# Patient Record
Sex: MALE | Race: WHITE
[De-identification: names, ages, dates, MRNs, and addresses within clinical notes are randomized per-mention and may not be internally consistent; named-entity substitution may affect disease eponyms.]

---

## 2017-06-26 ENCOUNTER — HOSPITAL (OUTPATIENT)
Dept: OTHER | Age: 52
End: 2017-06-26
Attending: OPHTHALMOLOGY

## 2017-06-26 LAB
DEVICE SN: ABNORMAL
POC_GLU: 117 MG/DL (ref 70–99)
TECH_ID: ABNORMAL

## 2018-04-10 PROBLEM — E78.5 HYPERLIPIDEMIA LDL GOAL <70: Status: ACTIVE | Noted: 2018-04-10

## 2018-04-10 PROBLEM — E11.39 UNCONTROLLED TYPE 2 DIABETES MELLITUS WITH OTHER OPHTHALMIC COMPLICATION, WITHOUT LONG-TERM CURRENT USE OF INSULIN: Status: ACTIVE | Noted: 2018-04-10

## 2018-04-10 PROBLEM — E11.65 UNCONTROLLED TYPE 2 DIABETES MELLITUS WITH OTHER OPHTHALMIC COMPLICATION, WITHOUT LONG-TERM CURRENT USE OF INSULIN: Status: ACTIVE | Noted: 2018-04-10

## 2018-04-20 PROBLEM — H33.22 LEFT RETINAL DETACHMENT: Status: ACTIVE | Noted: 2018-01-22

## 2018-07-31 PROCEDURE — 81001 URINALYSIS AUTO W/SCOPE: CPT | Performed by: INTERNAL MEDICINE

## 2018-07-31 PROCEDURE — 82570 ASSAY OF URINE CREATININE: CPT | Performed by: INTERNAL MEDICINE

## 2018-07-31 PROCEDURE — 82043 UR ALBUMIN QUANTITATIVE: CPT | Performed by: INTERNAL MEDICINE

## 2018-10-29 PROBLEM — E11.65 UNCONTROLLED TYPE 2 DIABETES MELLITUS WITH HYPERGLYCEMIA (HCC): Status: ACTIVE | Noted: 2018-04-10

## 2018-10-29 PROCEDURE — 82570 ASSAY OF URINE CREATININE: CPT | Performed by: INTERNAL MEDICINE

## 2018-10-29 PROCEDURE — 81001 URINALYSIS AUTO W/SCOPE: CPT | Performed by: INTERNAL MEDICINE

## 2018-10-29 PROCEDURE — 82043 UR ALBUMIN QUANTITATIVE: CPT | Performed by: INTERNAL MEDICINE

## 2020-01-22 ENCOUNTER — OFFICE VISIT (OUTPATIENT)
Dept: FAMILY MEDICINE | Facility: CLINIC | Age: 55
End: 2020-01-22
Payer: COMMERCIAL

## 2020-01-22 VITALS
WEIGHT: 148.4 LBS | BODY MASS INDEX: 21.98 KG/M2 | SYSTOLIC BLOOD PRESSURE: 118 MMHG | TEMPERATURE: 98.1 F | DIASTOLIC BLOOD PRESSURE: 82 MMHG | HEIGHT: 69 IN

## 2020-01-22 DIAGNOSIS — E78.5 HYPERLIPIDEMIA LDL GOAL <70: ICD-10-CM

## 2020-01-22 DIAGNOSIS — E11.40 TYPE 2 DIABETES MELLITUS WITH DIABETIC NEUROPATHY, WITHOUT LONG-TERM CURRENT USE OF INSULIN (H): Primary | ICD-10-CM

## 2020-01-22 PROBLEM — E11.65 UNCONTROLLED TYPE 2 DIABETES MELLITUS WITH HYPERGLYCEMIA (H): Status: RESOLVED | Noted: 2018-04-10 | Resolved: 2020-01-22

## 2020-01-22 PROBLEM — E11.65 UNCONTROLLED TYPE 2 DIABETES MELLITUS WITH HYPERGLYCEMIA (H): Status: ACTIVE | Noted: 2018-04-10

## 2020-01-22 PROBLEM — H33.22 LEFT RETINAL DETACHMENT: Status: ACTIVE | Noted: 2018-01-22

## 2020-01-22 LAB
CHOLEST SERPL-MCNC: 124 MG/DL
HBA1C MFR BLD: 6.5 % (ref 0–5.6)
HDLC SERPL-MCNC: 60 MG/DL
LDLC SERPL CALC-MCNC: 40 MG/DL
NONHDLC SERPL-MCNC: 64 MG/DL
TRIGL SERPL-MCNC: 122 MG/DL
TSH SERPL DL<=0.005 MIU/L-ACNC: 2.26 MU/L (ref 0.4–4)

## 2020-01-22 PROCEDURE — 83036 HEMOGLOBIN GLYCOSYLATED A1C: CPT | Performed by: FAMILY MEDICINE

## 2020-01-22 PROCEDURE — 80061 LIPID PANEL: CPT | Performed by: FAMILY MEDICINE

## 2020-01-22 PROCEDURE — 99203 OFFICE O/P NEW LOW 30 MIN: CPT | Performed by: FAMILY MEDICINE

## 2020-01-22 PROCEDURE — 36415 COLL VENOUS BLD VENIPUNCTURE: CPT | Performed by: FAMILY MEDICINE

## 2020-01-22 PROCEDURE — 84443 ASSAY THYROID STIM HORMONE: CPT | Performed by: FAMILY MEDICINE

## 2020-01-22 RX ORDER — ATORVASTATIN CALCIUM 10 MG/1
10 TABLET, FILM COATED ORAL
COMMUNITY
Start: 2019-10-08 | End: 2020-01-22

## 2020-01-22 RX ORDER — LANCETS 23 GAUGE
EACH MISCELLANEOUS
COMMUNITY
Start: 2019-05-07

## 2020-01-22 RX ORDER — ACETAMINOPHEN 160 MG
1000 TABLET,DISINTEGRATING ORAL
COMMUNITY

## 2020-01-22 RX ORDER — ATORVASTATIN CALCIUM 10 MG/1
10 TABLET, FILM COATED ORAL DAILY
Qty: 90 TABLET | Refills: 3 | Status: SHIPPED | OUTPATIENT
Start: 2020-01-22 | End: 2021-01-25

## 2020-01-22 ASSESSMENT — MIFFLIN-ST. JEOR: SCORE: 1485.58

## 2020-01-22 NOTE — PROGRESS NOTES
"Subjective     Tito Das is a 54 year old male who presents to clinic today for the following health issues: (seen today with the use of a phone intepretor.    HPI   Diabetes Follow-up    How often are you checking your blood sugar? Two times daily  Blood sugar testing frequency justification:  patient checks in morning and in evening  What time of day are you checking your blood sugars (select all that apply)?  Before meals  Have you had any blood sugars above 200?  { :[id number]::\"No, about 110  Have you had any blood sugars below 70?  No    What symptoms do you notice when your blood sugar is low?  None    What concerns do you have today about your diabetes? None     Do you have any of these symptoms? (Select all that apply)  Numbness in feet and hand, left side      He reports he has been on these doses for about 2 years since he was diagnosed with diabetes. The januvia was added in early. The metformin dose was tolerated well but not maximized. His last 3 a1c tests all were in the goal range.       BP Readings from Last 2 Encounters:   01/22/20 118/82     Hemoglobin A1C (%)   Date Value   01/22/2020 6.5 (H)                 How many servings of fruits and vegetables do you eat daily?  0-1    On average, how many sweetened beverages do you drink each day (Examples: soda, juice, sweet tea, etc.  Do NOT count diet or artificially sweetened beverages)?   0    How many days per week do you exercise enough to make your heart beat faster? 3 or less    How many minutes a day do you exercise enough to make your heart beat faster? 9 or less  How many days per week do you miss taking your medication? 2 times per month    What makes it hard for you to take your medications?  remembering to take            Reviewed and updated as needed this visit by Provider         Review of Systems   no cardiac symptoms nor other neuro symptoms, felt a lump on his abdomen once but not since      Objective    /82 (BP Location: Left " "arm, Patient Position: Sitting, Cuff Size: Adult Regular)   Temp 98.1  F (36.7  C) (Temporal)   Ht 1.74 m (5' 8.5\")   Wt 67.3 kg (148 lb 6.4 oz)   BMI 22.24 kg/m    Body mass index is 22.24 kg/m .  Physical Exam   GENERAL: healthy, alert and no distress  NECK: no adenopathy, no asymmetry, masses, or scars and thyroid normal to palpation  RESP: lungs clear to auscultation - no rales, rhonchi or wheezes  CV: regular rate and rhythm, normal S1 S2, no S3 or S4, no murmur, click or rub, no peripheral edema and peripheral pulses strong  ABDOMEN: soft, nontender, no hepatosplenomegaly, no masses and bowel sounds normal  MS: no gross musculoskeletal defects noted, no edema  No hernia noted  Foot exam completed: normal DP and PT pulses, no trophic changes or ulcerative lesions and normal sensory exam     Diagnostic Test Results:  Lab Results   Component Value Date    A1C 6.5 01/22/2020              Assessment & Plan     1. Type 2 diabetes mellitus with diabetic neuropathy, without long-term current use of insulin (H)  Controlled. Will try to lower the dose of januvia. Possibly this is no longer needed and we can control his blood sugar with just the metformin at the max dose. Recheck in 3 months. If at that time elevated would increase metformin. If still at goal would drop januvia and increase metformin. Diet and exercise discussed briefly.   - Lipid panel reflex to direct LDL Fasting  - Hemoglobin A1c  - TSH with free T4 reflex    2. Hyperlipidemia LDL goal <70   refilled lipitor       Tobacco Cessation:   reports that he has been smoking cigarettes. He has never used smokeless tobacco.  Tobacco Cessation Action Plan: Information offered: Patient not interested at this time      Return in about 3 months (around 4/22/2020) for Diabetes check.    [facility]      "

## 2020-03-01 ENCOUNTER — TRANSFERRED RECORDS (OUTPATIENT)
Dept: MULTI SPECIALTY CLINIC | Facility: CLINIC | Age: 55
End: 2020-03-01

## 2020-03-01 LAB — RETINOPATHY: NORMAL

## 2020-03-02 ENCOUNTER — TELEPHONE (OUTPATIENT)
Dept: FAMILY MEDICINE | Facility: CLINIC | Age: 55
End: 2020-03-02

## 2020-03-02 DIAGNOSIS — E11.40 TYPE 2 DIABETES MELLITUS WITH DIABETIC NEUROPATHY, WITHOUT LONG-TERM CURRENT USE OF INSULIN (H): Primary | ICD-10-CM

## 2020-03-26 ENCOUNTER — TELEPHONE (OUTPATIENT)
Dept: FAMILY MEDICINE | Facility: CLINIC | Age: 55
End: 2020-03-26

## 2020-03-26 DIAGNOSIS — E11.40 TYPE 2 DIABETES MELLITUS WITH DIABETIC NEUROPATHY, WITHOUT LONG-TERM CURRENT USE OF INSULIN (H): ICD-10-CM

## 2020-03-26 NOTE — TELEPHONE ENCOUNTER
Please let patient know:  I sent a 3 month supply of Januvia to the mail order pharmacy. Unfortunately, it is not listed on the 100% covered list anymore, so he may be subject to a co-pay. We can discuss an alternative medication if he desires that when he comes in in May.    Thanks!  Narda Fernandez DNP, NP-C 3/26/2020 4:24 PM

## 2020-03-26 NOTE — TELEPHONE ENCOUNTER
Called patient to reschedule his appt due to [facility] closing for the month of April. Patient rescheduled for May. Patient is going to run out Januvia. Please send to mail order.

## 2020-06-01 ENCOUNTER — VIRTUAL VISIT (OUTPATIENT)
Dept: FAMILY MEDICINE | Facility: CLINIC | Age: 55
End: 2020-06-01
Payer: COMMERCIAL

## 2020-06-01 DIAGNOSIS — E11.40 TYPE 2 DIABETES MELLITUS WITH DIABETIC NEUROPATHY, WITHOUT LONG-TERM CURRENT USE OF INSULIN (H): Primary | ICD-10-CM

## 2020-06-01 PROCEDURE — 99213 OFFICE O/P EST LOW 20 MIN: CPT | Mod: TEL | Performed by: NURSE PRACTITIONER

## 2020-06-01 ASSESSMENT — ENCOUNTER SYMPTOMS
CONSTITUTIONAL NEGATIVE: 1
RESPIRATORY NEGATIVE: 1
CARDIOVASCULAR NEGATIVE: 1

## 2020-06-01 NOTE — PROGRESS NOTES
"Tito Das is a 54 year old male who is being evaluated via a billable telephone visit.      The patient has been notified of following:     \"This telephone visit will be conducted via a call between you and your physician/provider. We have found that certain health care needs can be provided without the need for a physical exam.  This service lets us provide the care you need with a short phone conversation.  If a prescription is necessary we can send it directly to your pharmacy.  If lab work is needed we can place an order for that and you can then stop by our lab to have the test done at a later time.    Telephone visits are billed at different rates depending on your insurance coverage. During this emergency period, for some insurers they may be billed the same as an in-person visit.  Please reach out to your insurance provider with any questions.    If during the course of the call the physician/provider feels a telephone visit is not appropriate, you will not be charged for this service.\"    Patient has given verbal consent for Telephone visit?  Yes    What phone number would you like to be contacted at? [phone]    How would you like to obtain your AVS? Mail a copy    Subjective     Tito Das is a 54 year old male who presents via phone visit today for the following health issues:    HPI  Diabetes Follow-up    How often are you checking your blood sugar? One time daily, sometimes  What time of day are you checking your blood sugars (select all that apply)?  Before and after meals  Have you had any blood sugars above 200?  No  Have you had any blood sugars below 70?  No    What symptoms do you notice when your blood sugar is low?  Blurred vision    What concerns do you have today about your diabetes? None     Do you have any of these symptoms? (Select all that apply)  Blurry vision, ticking in feet    Have you had a diabetic eye exam in the last 12 months? Yes- Date of last eye exam: March 2020,  " Location: [facility]        BP Readings from Last 2 Encounters:   01/22/20 118/82     Hemoglobin A1C (%)   Date Value   01/22/2020 6.5 (H)     LDL Cholesterol Calculated (mg/dL)   Date Value   01/22/2020 40                 How many servings of fruits and vegetables do you eat daily?  0-1    On average, how many sweetened beverages do you drink each day (Examples: soda, juice, sweet tea, etc.  Do NOT count diet or artificially sweetened beverages)?   0    How many days per week do you exercise enough to make your heart beat faster? 3 or less    How many minutes a day do you exercise enough to make your heart beat faster? 9 or less    How many days per week do you miss taking your medication? 0      Additional provider notes: Last HgA1c, lipids, and TSH done on 1/22/20; last microalbumen done on 09/2019. Has 2 days left of metformin. Previous provider note indicated plan to continue to decrease Januvia and increase Metformin if needed based on HgA1c level.     Patient Active Problem List   Diagnosis     Anxiety state     Diabetic peripheral neuropathy (H)     Hyperlipidemia LDL goal <70     Left retinal detachment     Smoker     Vision loss, left eye     Vitamin D deficiency     Type 2 diabetes mellitus with diabetic neuropathy, without long-term current use of insulin (H)     History reviewed. No pertinent surgical history.    Social History     Tobacco Use     Smoking status: Current Every Day Smoker     Types: Cigarettes     Smokeless tobacco: Never Used   Substance Use Topics     Alcohol use: Not Currently     Family History   Family history unknown: Yes         Current Outpatient Medications   Medication Sig Dispense Refill     atorvastatin (LIPITOR) 10 MG tablet Take 1 tablet (10 mg) by mouth daily 90 tablet 3     blood glucose (ONETOUCH ULTRA) test strip        Cholecalciferol (VITAMIN D3) 50 MCG (2000 UT) CAPS Take 1,000 Units by mouth       lancets 28G MISC        Lancets 30G MISC Use to test blood  sugar daily as directed       metFORMIN (GLUCOPHAGE) 500 MG tablet Take 1 tablet by mouth in the morning and 2 tablets in the evening 90 tablet 0     sitagliptin (JANUVIA) 50 MG tablet Take 1 tablet (50 mg) by mouth daily 90 tablet 0     No Known Allergies  Recent Labs   Lab Test 01/22/20  1031   A1C 6.5*   LDL 40   HDL 60   TRIG 122   TSH 2.26      BP Readings from Last 3 Encounters:   01/22/20 118/82    Wt Readings from Last 3 Encounters:   01/22/20 67.3 kg (148 lb 6.4 oz)                    Reviewed and updated as needed this visit by Provider  Tobacco  Allergies  Meds  Problems  Med Hx  Surg Hx  Fam Hx         Review of Systems   Constitutional: Negative.    HENT: Negative.    Eyes: Positive for visual disturbance (chronic blurry vision; is being treated via eye doctor).   Respiratory: Negative.    Cardiovascular: Negative.    Skin: Negative.           Objective   Reported vitals:  There were no vitals taken for this visit.   healthy, alert, no distress and cooperative  PSYCH: Alert and oriented times 3; coherent speech, normal   rate and volume, able to articulate logical thoughts, able   to abstract reason, no tangential thoughts, no hallucinations   or delusions  His affect is normal and pleasant  RESP: No cough, no audible wheezing, able to talk in full sentences  Remainder of exam unable to be completed due to telephone visits    Diagnostic Test Results:  Labs reviewed in Epic; previous DM visit note reviewed.         Assessment/Plan:  1. Type 2 diabetes mellitus with diabetic neuropathy, without long-term current use of insulin (H)  Stable. Labs ordered. Patient is going to call the clinic to schedule lab appt later this week when his wife is able to drive him in. Depending on HgA1c level, may decrease Januvia further and adjust Metformin (based on recommendations from previous provider's note). Patient only has 2 days left of Metformin. Refill sent for 1 month supply to [facility] so patient  wouldn't run out of medication. Will send further refills to mail deliver pharmacy once HgA1c results.     - metFORMIN (GLUCOPHAGE) 500 MG tablet; Take 1 tablet by mouth in the morning and 2 tablets in the evening  Dispense: 90 tablet; Refill: 0  - Hemoglobin A1c  - Basic metabolic panel    Return in about 3 months (around 9/1/2020) for In-Clinic Visit, Lab Work.      Phone call duration:  24 minutes    Narda Fernandez DNP, NP-C 6/1/2020 12:53 PM

## 2020-06-01 NOTE — PATIENT INSTRUCTIONS
Lab work ordered. Please call and make appointment in [facility] to have lab work drawn. We will call you with the results and any further adjustments.    1 month supply of Metformin sent to [facility]. Will send any further scripts with changes to mail order pharmacy as previously done.    Follow-up in 3 months for lab recheck and office visit in person if clinic is open, otherwise, telephone/video visit.

## 2020-07-27 DIAGNOSIS — E11.40 TYPE 2 DIABETES MELLITUS WITH DIABETIC NEUROPATHY, WITHOUT LONG-TERM CURRENT USE OF INSULIN (H): Primary | ICD-10-CM

## 2020-07-27 LAB
ANION GAP SERPL CALCULATED.3IONS-SCNC: 2 MMOL/L (ref 3–14)
BUN SERPL-MCNC: 10 MG/DL (ref 7–30)
CALCIUM SERPL-MCNC: 8.9 MG/DL (ref 8.5–10.1)
CHLORIDE SERPL-SCNC: 110 MMOL/L (ref 94–109)
CO2 SERPL-SCNC: 29 MMOL/L (ref 20–32)
CREAT SERPL-MCNC: 0.86 MG/DL (ref 0.66–1.25)
GFR SERPL CREATININE-BSD FRML MDRD: >90 ML/MIN/{1.73_M2}
GLUCOSE SERPL-MCNC: 97 MG/DL (ref 70–99)
HBA1C MFR BLD: 6.7 % (ref 0–5.6)
POTASSIUM SERPL-SCNC: 4.2 MMOL/L (ref 3.4–5.3)
SODIUM SERPL-SCNC: 141 MMOL/L (ref 133–144)

## 2020-07-27 PROCEDURE — 80048 BASIC METABOLIC PNL TOTAL CA: CPT | Performed by: NURSE PRACTITIONER

## 2020-07-27 PROCEDURE — 36415 COLL VENOUS BLD VENIPUNCTURE: CPT | Performed by: NURSE PRACTITIONER

## 2020-07-27 PROCEDURE — 83036 HEMOGLOBIN GLYCOSYLATED A1C: CPT | Performed by: NURSE PRACTITIONER

## 2020-08-13 DIAGNOSIS — E11.40 TYPE 2 DIABETES MELLITUS WITH DIABETIC NEUROPATHY, WITHOUT LONG-TERM CURRENT USE OF INSULIN (H): ICD-10-CM

## 2020-08-17 ENCOUNTER — APPOINTMENT (OUTPATIENT)
Dept: INTERPRETER SERVICES | Facility: CLINIC | Age: 55
End: 2020-08-17

## 2020-08-18 ENCOUNTER — APPOINTMENT (OUTPATIENT)
Dept: INTERPRETER SERVICES | Facility: CLINIC | Age: 55
End: 2020-08-18

## 2020-09-01 ENCOUNTER — APPOINTMENT (OUTPATIENT)
Dept: INTERPRETER SERVICES | Facility: CLINIC | Age: 55
End: 2020-09-01

## 2020-09-03 DIAGNOSIS — E11.40 TYPE 2 DIABETES MELLITUS WITH DIABETIC NEUROPATHY, WITHOUT LONG-TERM CURRENT USE OF INSULIN (H): ICD-10-CM

## 2020-10-26 ENCOUNTER — OFFICE VISIT (OUTPATIENT)
Dept: FAMILY MEDICINE | Facility: CLINIC | Age: 55
End: 2020-10-26
Payer: COMMERCIAL

## 2020-10-26 VITALS
DIASTOLIC BLOOD PRESSURE: 82 MMHG | SYSTOLIC BLOOD PRESSURE: 124 MMHG | HEART RATE: 80 BPM | TEMPERATURE: 97.5 F | WEIGHT: 146.4 LBS | BODY MASS INDEX: 21.94 KG/M2 | OXYGEN SATURATION: 100 %

## 2020-10-26 DIAGNOSIS — Z72.0 TOBACCO ABUSE: ICD-10-CM

## 2020-10-26 DIAGNOSIS — E78.5 HYPERLIPIDEMIA LDL GOAL <70: ICD-10-CM

## 2020-10-26 DIAGNOSIS — E11.40 TYPE 2 DIABETES MELLITUS WITH DIABETIC NEUROPATHY, WITHOUT LONG-TERM CURRENT USE OF INSULIN (H): Primary | ICD-10-CM

## 2020-10-26 LAB
CREAT UR-MCNC: 153 MG/DL
HBA1C MFR BLD: 6.6 % (ref 0–5.6)
MICROALBUMIN UR-MCNC: 22 MG/L
MICROALBUMIN/CREAT UR: 14.05 MG/G CR (ref 0–17)

## 2020-10-26 PROCEDURE — 36415 COLL VENOUS BLD VENIPUNCTURE: CPT | Performed by: NURSE PRACTITIONER

## 2020-10-26 PROCEDURE — 99213 OFFICE O/P EST LOW 20 MIN: CPT | Performed by: NURSE PRACTITIONER

## 2020-10-26 PROCEDURE — 83036 HEMOGLOBIN GLYCOSYLATED A1C: CPT | Performed by: NURSE PRACTITIONER

## 2020-10-26 PROCEDURE — 82043 UR ALBUMIN QUANTITATIVE: CPT | Performed by: NURSE PRACTITIONER

## 2020-10-26 NOTE — PROGRESS NOTES
Subjective     Tito Das is a 55 year old male who presents to clinic today for the following health issues with phone :        Diabetes Follow-up    How often are you checking your blood sugar? A few times a month  What time of day are you checking your blood sugars (select all that apply)?  Before meals  Have you had any blood sugars above 200?  No  Have you had any blood sugars below 70?  No    What symptoms do you notice when your blood sugar is low?  None    What concerns do you have today about your diabetes? None     Do you have any of these symptoms? (Select all that apply)  Numbness in feet, tingling    BP Readings from Last 2 Encounters:   10/26/20 124/82   01/22/20 118/82     Hemoglobin A1C (%)   Date Value   10/26/2020 6.6 (H)   07/27/2020 6.7 (H)     LDL Cholesterol Calculated (mg/dL)   Date Value   01/22/2020 40       How many servings of fruits and vegetables do you eat daily?  0-1    On average, how many sweetened beverages do you drink each day (Examples: soda, juice, sweet tea, etc.  Do NOT count diet or artificially sweetened beverages)?   0    How many days per week do you exercise enough to make your heart beat faster? 3 or less    How many minutes a day do you exercise enough to make your heart beat faster? 30 - 60  How many days per week do you miss taking your medication? 1    What makes it hard for you to take your medications?  remembering to take    He is smoking about 8-9 cigarettes daily and not ready to quit yet. He last saw the dentist about 2 years ago. He last saw eye doctor a month ago and has an upcoming appointment with eye doctor in 2 weeks. He denies any vision changes.     He has been taking Metformin 500 mg in AM and 1000 mg in evening as well as Januvia 50 mg daily. Denies any low blood sugar readings. He would like to stop Januvia.     Problem list, Medication list, Allergies, and Medical history reviewed in EPIC.    ROS:  Review of systems negative  except for noted above        Objective    /82 (BP Location: Left arm, Patient Position: Sitting, Cuff Size: Adult Regular)   Pulse 80   Temp 97.5  F (36.4  C) (Tympanic)   Wt 66.4 kg (146 lb 6.4 oz)   SpO2 100%   BMI 21.94 kg/m    Body mass index is 21.94 kg/m .  Physical Exam  Constitutional:       General: He is not in acute distress.     Appearance: He is not ill-appearing, toxic-appearing or diaphoretic.   HENT:      Head: Normocephalic and atraumatic.   Cardiovascular:      Rate and Rhythm: Normal rate and regular rhythm.      Pulses: Normal pulses.      Heart sounds: Normal heart sounds. No murmur. No friction rub. No gallop.    Pulmonary:      Effort: Pulmonary effort is normal. No respiratory distress.      Breath sounds: Normal breath sounds. No wheezing, rhonchi or rales.   Lymphadenopathy:      Cervical: No cervical adenopathy.   Skin:     General: Skin is warm and dry.   Neurological:      Sensory: No sensory deficit.      Comments: 10-point monofilament test normal        Results for orders placed or performed in visit on 10/26/20   Hemoglobin A1c     Status: Abnormal   Result Value Ref Range    Hemoglobin A1C 6.6 (H) 0 - 5.6 %       Assessment & Plan     Tito was seen today for diabetes.    Diagnoses and all orders for this visit:    Type 2 diabetes mellitus with diabetic neuropathy, without long-term current use of insulin (H)  -     Hemoglobin A1c  -     Albumin Random Urine Quantitative with Creat Ratio  -     metFORMIN (GLUCOPHAGE) 500 MG tablet; Take 1 tablet by mouth in the morning and 2 tablets in the evening  -     blood glucose (ONETOUCH ULTRA) test strip; 1 strip by In Vitro route 4 times daily    A1C reviewed during appointment which decreased from 6.7 to 6.6 over past 3 months. Patient would like to stop Januvia, as recommended at January, 2020 appointment. Contacted Dr. Mendoza who recommends stopping Januvia, continue metformin at current dose and recheck a1c in 3 months.  Patient not yet at maximum metformin dosing, and could increase if needed at next visit to 1000 mg twice daily. Refills sent to pharmacy for metformin and blood glucose test strips. Microalbumin urine in process, will contact patient when results available.     Tobacco abuse    Tobacco cessation encouraged, but patient declines at this time.        Hyperlipidemia    Fasting cholesterol panel in 3-months, ordered    Patient declines influenza and Tdap vaccinations today. Dentist visit recommended.     Return in about 3 months (around 1/26/2021) for In-Clinic Visit. with hemoglobin A1C, fasting lipids.     All questions were answered and patient verbalized understanding. AVS reviewed with patient.     Verónica Duarte, DNP, APRN, CNP 10/26/2020 12:15 PM    [facility] Provider  [facility]

## 2020-10-26 NOTE — PATIENT INSTRUCTIONS
Stop Januvia.   Continue same dose of Metformin: 500 mg in morning and 1000 mg in evening  Follow-up in 3 months for recheck appointment, A1C, fasting cholesterol  Made dental appointment  Follow-up with eye doctor as planned  Tobacco cessation recommended

## 2021-01-25 ENCOUNTER — OFFICE VISIT (OUTPATIENT)
Dept: FAMILY MEDICINE | Facility: CLINIC | Age: 56
End: 2021-01-25
Payer: COMMERCIAL

## 2021-01-25 VITALS
TEMPERATURE: 97.4 F | OXYGEN SATURATION: 99 % | SYSTOLIC BLOOD PRESSURE: 122 MMHG | DIASTOLIC BLOOD PRESSURE: 78 MMHG | HEART RATE: 70 BPM

## 2021-01-25 DIAGNOSIS — E78.5 HYPERLIPIDEMIA LDL GOAL <70: ICD-10-CM

## 2021-01-25 DIAGNOSIS — E11.40 TYPE 2 DIABETES MELLITUS WITH DIABETIC NEUROPATHY, WITHOUT LONG-TERM CURRENT USE OF INSULIN (H): Primary | ICD-10-CM

## 2021-01-25 DIAGNOSIS — M62.89 HAMSTRING TIGHTNESS OF LEFT LOWER EXTREMITY: ICD-10-CM

## 2021-01-25 DIAGNOSIS — R09.82 POST-NASAL DRIP: ICD-10-CM

## 2021-01-25 LAB
CHOLEST SERPL-MCNC: 166 MG/DL
HBA1C MFR BLD: 6.9 % (ref 0–5.6)
HDLC SERPL-MCNC: 72 MG/DL
LDLC SERPL CALC-MCNC: 55 MG/DL
NONHDLC SERPL-MCNC: 94 MG/DL
TRIGL SERPL-MCNC: 194 MG/DL

## 2021-01-25 PROCEDURE — 83036 HEMOGLOBIN GLYCOSYLATED A1C: CPT | Performed by: NURSE PRACTITIONER

## 2021-01-25 PROCEDURE — 36415 COLL VENOUS BLD VENIPUNCTURE: CPT | Performed by: NURSE PRACTITIONER

## 2021-01-25 PROCEDURE — 99214 OFFICE O/P EST MOD 30 MIN: CPT | Performed by: NURSE PRACTITIONER

## 2021-01-25 PROCEDURE — 80061 LIPID PANEL: CPT | Performed by: NURSE PRACTITIONER

## 2021-01-25 RX ORDER — ACETAMINOPHEN 325 MG/1
325-650 TABLET ORAL EVERY 6 HOURS PRN
COMMUNITY

## 2021-01-25 RX ORDER — FLUTICASONE PROPIONATE 50 MCG
1 SPRAY, SUSPENSION (ML) NASAL DAILY
Qty: 16 G | Refills: 3 | Status: SHIPPED | OUTPATIENT
Start: 2021-01-25 | End: 2022-01-19

## 2021-01-25 RX ORDER — ATORVASTATIN CALCIUM 10 MG/1
10 TABLET, FILM COATED ORAL DAILY
Qty: 90 TABLET | Refills: 1 | Status: SHIPPED | OUTPATIENT
Start: 2021-01-25 | End: 2021-04-01

## 2021-01-25 NOTE — PROGRESS NOTES
Assessment & Plan     Type 2 diabetes mellitus with diabetic neuropathy, without long-term current use of insulin (H)    - Hemoglobin A1c  - metFORMIN (GLUCOPHAGE) 500 MG tablet; Take 2 tablets (1,000 mg) by mouth 2 times daily (with meals)    Hemoglobin A1C increased from 6.6 to 6.9 after stopping januvia. Metformin is increased to 1000 mg twice daily, prescription sent to pharmacy.     Hyperlipidemia LDL goal <70    - Lipid panel reflex to direct LDL Fasting  - atorvastatin (LIPITOR) 10 MG tablet; Take 1 tablet (10 mg) by mouth daily    Lipid testing in process, will notify patient when results available. Atorvastatin prescription sent to pharmacy.     Post-nasal drip    - fluticasone (FLONASE) 50 MCG/ACT nasal spray; Spray 1 spray into both nostrils daily  Prescription sent to pharmacy for flonase nasal spray. Recommended increasing water intake, steam, humidifier, neti pot saline irrigation twice daily. Reassured this can be normal from tobacco cessation as the cilia come back alive.     Hamstring tightness of left lower extremity  Demonstrated stretches to stretch hamstring and piriformis. If not effective, will have him evaluated by Donya, physical therapist.    Recommended dental visit. He declines colonoscopy at this time. He declines vaccines for influenza, Hep B, PPVS23, Tdap.     Return in about 3 months (around 4/25/2021) for Lab Work, In-Clinic Visit.    Discussed red flag symptoms which warrant immediate visit in emergency room    All questions were answered and patient verbalized understanding. AVS reviewed with patient.     Verónica Duarte, DNP, APRN, CNP 1/25/2021 8:01 AM  [facility]    Andre Francis is a 55 year old who presents to clinic today for the following health issues with Frisian phone     Diabetes Follow-up    How often are you checking your blood sugar? Two times daily  Blood sugar testing frequency justification:  Uncontrolled  diabetes  What time of day are you checking your blood sugars (select all that apply)?  Before and after meals  Have you had any blood sugars above 200?  No  Have you had any blood sugars below 70?  No    What symptoms do you notice when your blood sugar is low?  None    What concerns do you have today about your diabetes? None     Do you have any of these symptoms? (Select all that apply)  No numbness or tingling in feet.  No redness, sores or blisters on feet.  No complaints of excessive thirst.  No reports of blurry vision.  No significant changes to weight.      BP Readings from Last 2 Encounters:   01/25/21 122/78   10/26/20 124/82     Hemoglobin A1C (%)   Date Value   01/25/2021 6.9 (H)   10/26/2020 6.6 (H)     LDL Cholesterol Calculated (mg/dL)   Date Value   01/22/2020 40                 How many servings of fruits and vegetables do you eat daily?  0-1    On average, how many sweetened beverages do you drink each day (Examples: soda, juice, sweet tea, etc.  Do NOT count diet or artificially sweetened beverages)?   0    How many days per week do you exercise enough to make your heart beat faster? 3 or less    How many minutes a day do you exercise enough to make your heart beat faster? 9 or less  How many days per week do you miss taking your medication? 2    What makes it hard for you to take your medications?  remembering to take    At last visit on 10/26/20 he stopped Januvia due to blood sugars being better controlled with hemoglobin A1C decreasing from 6.7 to 6.6. He has been taking metformin 500 mg in the morning and 100 mg in the evening. He last saw eye doctor Jan 4, 2021 and he will be following up in March. He has not seen a dentist yet.     He stopped smoking in November, 2020 cold turkey and has been having increased throat irritation related to increased phlegm. He denies cough, as the mucous is too thick to cough out. He declines any medication or support for tobacco cessation at this time, as  his cravings are under control.     He has been experiencing left leg tightness in his left thigh for the past 6 months that is intermittent. Located in left gluteal fold into buttock. Denies numbness, tingling, pain, erythema, swelling. He does walking for physical activity. Tightness is stable. He states he has been told his blood vessels are weak.     Problem list, Medication list, Allergies, and Medical history reviewed in EPIC.    ROS:  Review of systems negative except for noted above        Objective    /78 (BP Location: Left arm, Patient Position: Sitting, Cuff Size: Adult Regular)   Pulse 70   Temp 97.4  F (36.3  C) (Tympanic)   SpO2 99%   There is no height or weight on file to calculate BMI.  Physical Exam  Constitutional:       General: He is not in acute distress.     Appearance: He is not ill-appearing, toxic-appearing or diaphoretic.   HENT:      Head: Normocephalic and atraumatic.      Nose: Nose normal.      Mouth/Throat:      Mouth: Mucous membranes are moist.      Pharynx: Oropharynx is clear. No oropharyngeal exudate or posterior oropharyngeal erythema.   Cardiovascular:      Rate and Rhythm: Normal rate and regular rhythm.      Heart sounds: Normal heart sounds.   Pulmonary:      Effort: Pulmonary effort is normal. No respiratory distress.      Breath sounds: Normal breath sounds. No wheezing, rhonchi or rales.   Musculoskeletal:      Right lower leg: No edema.      Left lower leg: No edema.   Lymphadenopathy:      Cervical: No cervical adenopathy.

## 2021-01-25 NOTE — PATIENT INSTRUCTIONS
Congratulations on stopping smoking- keep up the great work!  Increase metformin to 1000 mg twice daily (2 tabs) twice daily      Increased phlegm is expected after stopping smoking- increase water intake, use neti pot saline irrigation, flonase nasal spray, steam, humidifier      Patient Education     The Benefits of Living Smoke Free  What do you want to gain from quitting? Check off some reasons to quit.  Health benefits  ___  Improve my ability to breathe without coughing or shortness of breath  ___  Reduce my risk of lung cancer, heart disease, chronic lung disease  ___  Have fewer wrinkles and softer skin  ___  Improve my sense of taste and smell  ___  For pregnant women--reduce the risk of having a miscarriage, stillbirth, premature birth, or low-birth-weight baby  Personal benefits  ___  Feel more in control of my life  ___  Have better-smelling hair, breath, clothes, home, and car  ___  Save time by not having to take smoke breaks, buy cigarettes, or hunt for a light  ___  Have whiter teeth  Family benefits  ___  Reduce my children s respiratory tract infections  ___  Set a good example for my children  ___  Reduce my family s cancer risk  Financial benefits  ___  Save hundreds of dollars each year that would be spent on cigarettes  ___  Save money on medical bills  ___  Save on life, health, and car insurance premiums     Those dollars add up!  Cigarettes are expensive, and getting more expensive all the time. Do you realize how much money you are spending on cigarettes per year? What is the average amount you spend on a pack of cigarettes? What is the average number of packs that you smoke per day? Using your answers to these questions, fill in this formula to help you find out:  ($ _____ per pack) ×  ( _____ number of packs per day) × (365 days) =  $ _____ yearly cost of smoking  Besides tobacco, there are other costs, including extra cleaning bills and replacement costs for clothing and furniture;  medical expenses for smoking-related illnesses; and higher health, life, and car insurance premiums.  Cigars and pipes count too!  Cigars and pipes are also dangerous. So are smokeless (chewing) tobacco and snuff. All of these products contain nicotine, a highly addictive substance that has harmful effects on your body. Quitting smoking means giving up all tobacco products.      For more information    https://smokefree.gov/fnmh-bi-oa-expert    National [employer] [place] Smoking Quitline: 877-44U-QUIT (504-084-0890)   StayWell last reviewed this educational content on 2/1/2017 2000-2020 [employer] [address] [place] [address], [place] [address]. All rights reserved. This information is not intended as a substitute for professional medical care. Always follow your healthcare professional's instructions.

## 2021-01-27 ENCOUNTER — APPOINTMENT (OUTPATIENT)
Dept: INTERPRETER SERVICES | Facility: CLINIC | Age: 56
End: 2021-01-27
Payer: COMMERCIAL

## 2021-03-31 ENCOUNTER — TELEPHONE (OUTPATIENT)
Dept: URGENT CARE | Facility: URGENT CARE | Age: 56
End: 2021-03-31

## 2021-03-31 NOTE — TELEPHONE ENCOUNTER
Called and left a voice mail, recommend that if he is having left sided chest pressure then he should go in to be seen in the Emergency room today instead waiting until tomorrow. I am willing to see him tomorrow but my concern is that if he is having acute symptoms of MI then he should be seen as soon as possible in an emergency dept.     Lyubov Mendoza, APRN, CNP

## 2021-04-01 ENCOUNTER — OFFICE VISIT (OUTPATIENT)
Dept: FAMILY MEDICINE | Facility: CLINIC | Age: 56
End: 2021-04-01
Payer: COMMERCIAL

## 2021-04-01 VITALS
DIASTOLIC BLOOD PRESSURE: 82 MMHG | SYSTOLIC BLOOD PRESSURE: 118 MMHG | WEIGHT: 146.6 LBS | HEART RATE: 83 BPM | TEMPERATURE: 98.8 F | OXYGEN SATURATION: 98 % | BODY MASS INDEX: 21.97 KG/M2

## 2021-04-01 DIAGNOSIS — R07.0 THROAT PAIN IN ADULT: Primary | ICD-10-CM

## 2021-04-01 DIAGNOSIS — E78.5 HYPERLIPIDEMIA LDL GOAL <70: ICD-10-CM

## 2021-04-01 PROCEDURE — 99213 OFFICE O/P EST LOW 20 MIN: CPT | Performed by: NURSE PRACTITIONER

## 2021-04-01 RX ORDER — ATORVASTATIN CALCIUM 10 MG/1
10 TABLET, FILM COATED ORAL DAILY
Qty: 90 TABLET | Refills: 1 | Status: CANCELLED | OUTPATIENT
Start: 2021-04-01

## 2021-04-01 RX ORDER — ATORVASTATIN CALCIUM 10 MG/1
10 TABLET, FILM COATED ORAL DAILY
Qty: 90 TABLET | Refills: 3 | Status: SHIPPED | OUTPATIENT
Start: 2021-04-01 | End: 2022-05-23

## 2021-04-01 ASSESSMENT — ANXIETY QUESTIONNAIRES
1. FEELING NERVOUS, ANXIOUS, OR ON EDGE: NEARLY EVERY DAY
3. WORRYING TOO MUCH ABOUT DIFFERENT THINGS: NEARLY EVERY DAY
7. FEELING AFRAID AS IF SOMETHING AWFUL MIGHT HAPPEN: NEARLY EVERY DAY
GAD7 TOTAL SCORE: 14
2. NOT BEING ABLE TO STOP OR CONTROL WORRYING: MORE THAN HALF THE DAYS
5. BEING SO RESTLESS THAT IT IS HARD TO SIT STILL: NEARLY EVERY DAY
6. BECOMING EASILY ANNOYED OR IRRITABLE: NOT AT ALL
IF YOU CHECKED OFF ANY PROBLEMS ON THIS QUESTIONNAIRE, HOW DIFFICULT HAVE THESE PROBLEMS MADE IT FOR YOU TO DO YOUR WORK, TAKE CARE OF THINGS AT HOME, OR GET ALONG WITH OTHER PEOPLE: NOT DIFFICULT AT ALL

## 2021-04-01 ASSESSMENT — ENCOUNTER SYMPTOMS
APPETITE CHANGE: 1
FACIAL SWELLING: 0
CARDIOVASCULAR NEGATIVE: 1
RESPIRATORY NEGATIVE: 1
TROUBLE SWALLOWING: 0
GASTROINTESTINAL NEGATIVE: 1
ACTIVITY CHANGE: 1
SORE THROAT: 1

## 2021-04-01 ASSESSMENT — PATIENT HEALTH QUESTIONNAIRE - PHQ9
5. POOR APPETITE OR OVEREATING: NOT AT ALL
SUM OF ALL RESPONSES TO PHQ QUESTIONS 1-9: 14

## 2021-04-01 NOTE — PROGRESS NOTES
Assessment & Plan     Throat pain in adult  DD: GERD, lesion, cancer or other Throat etiology. History of tobacco use, quit in Nov 2020. If this is not working within 1-2 weeks then I will refer him quickly to an ENT to evaluate the throat further.   - omeprazole (PRILOSEC) 20 MG DR capsule  Dispense: 30 capsule; Refill: 0    Hyperlipidemia LDL goal <70  - atorvastatin (LIPITOR) 10 MG tablet  Dispense: 90 tablet; Refill: 3  -lipids controlled       Depression Screening Follow Up    PHQ 4/1/2021   PHQ-9 Total Score 14   Q9: Thoughts of better off dead/self-harm past 2 weeks Not at all     Last PHQ-9 4/1/2021   1.  Little interest or pleasure in doing things 3   2.  Feeling down, depressed, or hopeless 3   3.  Trouble falling or staying asleep, or sleeping too much 0   4.  Feeling tired or having little energy 3   5.  Poor appetite or overeating 2   6.  Feeling bad about yourself 3   7.  Trouble concentrating 0   8.  Moving slowly or restless 0   Q9: Thoughts of better off dead/self-harm past 2 weeks 0   PHQ-9 Total Score 14   Difficulty at work, home, or with people Not difficult at all       Follow Up Actions Taken  Follow up recommended: I will watch him closely and see if his throat improves if his overall mental health will get better.        Return in about 2 weeks (around 4/15/2021) for Follow up.    MATT Rivas CNP  [facility]    Andre Francis is a 55 year old who presents for the following health issues  accompanied by his spouse:    HPI     Chief Complaint   Patient presents with     Cough     stopped smoking in November 2020, feels like there a lot mucus, throat is bothered, lots of clearing, chest is sore, difficulty with breathing     Patient complains of a sensation of foreign body in his throat. Has been ongoing for past 3-4 months since December is many times a day and has some violent gagging episodes, some mucus but not the doesn't seem like  there is any that comes up. No history trauma, was a smoker and quit smoking in November of last year. No difficulty eating, sensation in not worse or related to eating or at nighttime. Seems like it is there all the time. At times when the sensation is at its worse then he also has epigastric pain. Denies shortness of breath, neck pain, chest pain, pain down left arm.     Hyperlipidemia Follow-Up      Are you regularly taking any medication or supplement to lower your cholesterol?   Yes- Lipitor    Are you having muscle aches or other side effects that you think could be caused by your cholesterol lowering medication?  No      How many servings of fruits and vegetables do you eat daily?  4 or more    On average, how many sweetened beverages do you drink each day (Examples: soda, juice, sweet tea, etc.  Do NOT count diet or artificially sweetened beverages)?   0    How many days per week do you exercise enough to make your heart beat faster? 3 or less    How many minutes a day do you exercise enough to make your heart beat faster? 9 or less    How many days per week do you miss taking your medication? 0      Review of Systems   Constitutional: Positive for activity change and appetite change.   HENT: Positive for sore throat. Negative for congestion, drooling, ear discharge, ear pain, facial swelling and trouble swallowing.    Eyes:        History of Glaucoma, sees an eye specialist.    Respiratory: Negative.    Cardiovascular: Negative.    Gastrointestinal: Negative.         Some epigastric pain   All other systems reviewed and are negative.           Objective    /82 (BP Location: Left arm, Patient Position: Sitting, Cuff Size: Adult Regular)   Pulse 83   Temp 98.8  F (37.1  C) (Tympanic)   Wt 66.5 kg (146 lb 9.6 oz)   SpO2 98%   BMI 21.97 kg/m    There is no height or weight on file to calculate BMI.  Physical Exam  Constitutional:       Appearance: Normal appearance.   HENT:      Right Ear: Tympanic  membrane and ear canal normal.      Left Ear: Tympanic membrane and ear canal normal.      Mouth/Throat:      Mouth: Mucous membranes are dry.      Pharynx: Oropharynx is clear. No oropharyngeal exudate or posterior oropharyngeal erythema.   Neck:      Musculoskeletal: Normal range of motion and neck supple.   Cardiovascular:      Rate and Rhythm: Normal rate and regular rhythm.      Heart sounds: Normal heart sounds.   Pulmonary:      Effort: Pulmonary effort is normal.      Breath sounds: Normal breath sounds.   Abdominal:      General: Abdomen is flat. Bowel sounds are normal. There is no distension.      Palpations: Abdomen is soft. There is no mass.      Tenderness: There is no abdominal tenderness. There is no guarding or rebound.      Hernia: No hernia is present.   Lymphadenopathy:      Cervical: No cervical adenopathy.   Neurological:      Mental Status: He is alert.

## 2021-04-01 NOTE — PATIENT INSTRUCTIONS
"Resources    Documentaries  Fed Up (amazon prime)  Fat Fiction (amazon Prime)    Podcasts  Pursuing Health Episode 150 \"Our Approach to Nutrition\"  The Obesity Code podcast  2Ketodudes  [facility]  Diet doctor podcast    YouTube  Dr Justin Wylie: Readdressing Dietary Guidelines  Dr Justin Wylie: What if we are wrong about Diabetes? (Gabriele Talk)  Dr Darrell Valdez The Aetiolgy of Obesity  Mayco Salmon (Gabriele Talk)  Dr Sneha Braswell (Tedx Talk) \"Reversing Type 2 Diabetes Starts with Ignoring the Guidelines\"    Websites  [email or web address]  [email or web address]  [email or web address]  precisionnutrition.com  Efficas[email or web address]    Apps  Carb Manager: Keto diet tracker and macros counter  ItrackBites: diet Tracker and weight loss diary  Calorie, carb & Fat counter  KetoManager: Low Carb Diet Tracker, Macro counter  My Fitness Pal    Books:  The Obesity Code by Dr Darrell Valdez  Eat Fat, Get Thin by Dr Ernesto Edmond  The Case Against Sugar by Leif Eduardo  Why We Get Fat by Leif Eduardo  Always Hungry by Dr Jayro Yuan  Fat Chance by Dr Isac Viveros  Keto Clarity by Dr Dylan Lamb  The Art and Science of Low Carbohydrate Living by Seng Pal and Jayjay William    "

## 2021-04-02 ASSESSMENT — ANXIETY QUESTIONNAIRES: GAD7 TOTAL SCORE: 14

## 2021-04-19 ENCOUNTER — OFFICE VISIT (OUTPATIENT)
Dept: FAMILY MEDICINE | Facility: CLINIC | Age: 56
End: 2021-04-19
Payer: COMMERCIAL

## 2021-04-19 VITALS
SYSTOLIC BLOOD PRESSURE: 122 MMHG | HEART RATE: 73 BPM | DIASTOLIC BLOOD PRESSURE: 78 MMHG | TEMPERATURE: 97.3 F | OXYGEN SATURATION: 100 %

## 2021-04-19 DIAGNOSIS — E11.40 TYPE 2 DIABETES MELLITUS WITH DIABETIC NEUROPATHY, WITHOUT LONG-TERM CURRENT USE OF INSULIN (H): Primary | ICD-10-CM

## 2021-04-19 DIAGNOSIS — Z87.891 FORMER SMOKER, STOPPED SMOKING IN DISTANT PAST: ICD-10-CM

## 2021-04-19 DIAGNOSIS — R07.0 THROAT PAIN IN ADULT: ICD-10-CM

## 2021-04-19 LAB — HBA1C MFR BLD: 7.4 % (ref 0–5.6)

## 2021-04-19 PROCEDURE — 99214 OFFICE O/P EST MOD 30 MIN: CPT

## 2021-04-19 PROCEDURE — 83036 HEMOGLOBIN GLYCOSYLATED A1C: CPT | Performed by: NURSE PRACTITIONER

## 2021-04-19 PROCEDURE — 36415 COLL VENOUS BLD VENIPUNCTURE: CPT | Performed by: NURSE PRACTITIONER

## 2021-04-19 RX ORDER — GLIMEPIRIDE 1 MG/1
1 TABLET ORAL
Qty: 60 TABLET | Refills: 2 | Status: SHIPPED | OUTPATIENT
Start: 2021-04-19 | End: 2021-10-20

## 2021-04-19 RX ORDER — ASPIRIN 81 MG/1
81 TABLET ORAL DAILY
Qty: 120 TABLET | Status: CANCELLED | OUTPATIENT
Start: 2021-04-19

## 2021-04-19 ASSESSMENT — ENCOUNTER SYMPTOMS
RESPIRATORY NEGATIVE: 1
CARDIOVASCULAR NEGATIVE: 1
NEUROLOGICAL NEGATIVE: 1
CONSTITUTIONAL NEGATIVE: 1
HEARTBURN: 1

## 2021-04-19 NOTE — PROGRESS NOTES
Assessment & Plan     Type 2 diabetes mellitus with diabetic neuropathy, without long-term current use of insulin (H)    - Hemoglobin A1c  - glimepiride (AMARYL) 1 MG tablet; Take 1 tablet (1 mg) by mouth every morning (before breakfast) Take 1 tablet daily x 2 weeks, then increase to 2mg daily  - Hemoglobin A1c; Future    Throat pain in adult    - omeprazole (PRILOSEC) 20 MG DR capsule; Take 2 capsules (40 mg) by mouth daily Follow-up if symptoms are not improving after 2 weeks    Former smoker, stopped smoking in distant past        Former smoker, stopped smoking in distant past  Quit smoking in November 2020.    Type 2 diabetes mellitus with diabetic neuropathy, without long-term current use of insulin (H)  Januvia was recently discontinued due to cost. Patient has been on Metformin and dose was increased to 1000mg BID in January 2021 due to increase in A1C since discontinuation of Januvia. Today A1C is elevated at 7.4 from 6.9. Patient reports limited exercise and irregular eating habits.  declined today, but willing to see her in 3 months if A1C still elevated. Glimepiride was added on with titration instructions. Discussed s/s of hypoglycemia and to follow-up immediately if he notices that. Follow-up in 3 months for in clinic visit with A1C recheck. Patient declined ASA ppx at this time. Will re-evaluate in 3 months.    Throat pain in adult  Ongoing throat pain. Was seen on 4/1/21 and treated for heartburn with strict instructions to follow-up if symptoms did not improve in 2 weeks. Patient has been taking 2 tablets daily with minimal improvement. He does not want to follow-up with ENT yet, would prefer to do 2 more weeks of treatment. Discussed risks. Advised follow-up sooner if symptoms worsen.        Review of the result(s) of each unique test - A1C  Prescription drug management         Patient Instructions   Diabetes:   1. Continue to modify eating and exercise routine.   2. Follow-up  with  in a few months (with next visit).   3. Continue taking Metformin. Start taking Glimeride as prescribed before breakfast.   4. Follow-up in 3 months for repeat A1C.   5. We can discuss possible aspirin next visit to prevent heart disease.     Throat pain:   You can try omeprazole for another 2 weeks as requested and if symptoms do not improve, please follow-up and we will send a referral to ENT. If symptoms get worse before the 2 weeks, please follow-up right away.       Return if symptoms worsen or fail to improve.    Narda Fernandez, DNP, NP-C   Fredy Felicita Provider  [facility]    Andre Francis is a 55 year old who presents for the following health issues  accompanied by his :    HPI     Diabetes Follow-up    How often are you checking your blood sugar? A few times a week  What time of day are you checking your blood sugars (select all that apply)?  Before and after meals  Have you had any blood sugars above 200?  No  Have you had any blood sugars below 70?  No    What symptoms do you notice when your blood sugar is low?  None    What concerns do you have today about your diabetes? None     Do you have any of these symptoms? (Select all that apply)  No numbness or tingling in feet.  No redness, sores or blisters on feet.  No complaints of excessive thirst.  No reports of blurry vision.  No significant changes to weight.      BP Readings from Last 2 Encounters:   04/19/21 122/78   04/01/21 118/82     Hemoglobin A1C (%)   Date Value   04/19/2021 7.4 (H)   01/25/2021 6.9 (H)     LDL Cholesterol Calculated (mg/dL)   Date Value   01/25/2021 55   01/22/2020 40                 How many servings of fruits and vegetables do you eat daily?  4 or more    On average, how many sweetened beverages do you drink each day (Examples: soda, juice, sweet tea, etc.  Do NOT count diet or artificially sweetened beverages)?   0    How many days per week do you exercise  enough to make your heart beat faster? 3 or less    How many minutes a day do you exercise enough to make your heart beat faster? 9 or less  How many days per week do you miss taking your medication? 2    What makes it hard for you to take your medications?  remembering to take    Additional provider notes:     DM follow-up: Checks BS a couple times a week. Avg BS: 110-130s. Diet hasn't been as good recently. Exercise consists of walking 15-20 minutes occasionally, not on a regular schedule. States he has seen a specialist back in [place], but hasn't worked with our  here. States he knows what to eat and what not to eat, it's a matter of making the right choice.     Throat pain: has been taking 2 of the omeprazole with little improvement in throat symptoms. Last note indicated patient would be referred to ENT if symptoms do not improve.     Smoker: quit smoking November 2020    Review of Systems   Constitutional: Negative.    Respiratory: Negative.    Cardiovascular: Negative.    Gastrointestinal: Positive for heartburn.   Skin: Negative.    Neurological: Negative.             Objective    /78 (BP Location: Left arm, Patient Position: Sitting, Cuff Size: Adult Regular)   Pulse 73   Temp 97.3  F (36.3  C) (Tympanic)   SpO2 100%   There is no height or weight on file to calculate BMI.  Physical Exam  Vitals signs and nursing note reviewed.   Constitutional:       General: He is not in acute distress.     Appearance: He is not ill-appearing or toxic-appearing.   Cardiovascular:      Rate and Rhythm: Normal rate and regular rhythm.      Pulses: Normal pulses.      Heart sounds: Normal heart sounds.   Pulmonary:      Effort: Pulmonary effort is normal.      Breath sounds: Normal breath sounds.   Abdominal:      General: Abdomen is flat. Bowel sounds are normal.      Palpations: Abdomen is soft.   Musculoskeletal: Normal range of motion.   Skin:     General: Skin is warm and dry.   Neurological:       Mental Status: He is alert and oriented to person, place, and time.   Psychiatric:         Behavior: Behavior normal.            Results for orders placed or performed in visit on 04/19/21 (from the past 24 hour(s))   Hemoglobin A1c   Result Value Ref Range    Hemoglobin A1C 7.4 (H) 0 - 5.6 %

## 2021-04-19 NOTE — ASSESSMENT & PLAN NOTE
Ongoing throat pain. Was seen on 4/1/21 and treated for heartburn with strict instructions to follow-up if symptoms did not improve in 2 weeks. Patient has been taking 2 tablets daily with minimal improvement. He does not want to follow-up with ENT yet, would prefer to do 2 more weeks of treatment. Discussed risks. Advised follow-up sooner if symptoms worsen.

## 2021-04-19 NOTE — PATIENT INSTRUCTIONS
Diabetes:   1. Continue to modify eating and exercise routine.   2. Follow-up with  in a few months (with next visit).   3. Continue taking Metformin. Start taking Glimeride as prescribed before breakfast.   4. Follow-up in 3 months for repeat A1C.   5. We can discuss possible aspirin next visit to prevent heart disease.     Throat pain:   You can try omeprazole for another 2 weeks as requested and if symptoms do not improve, please follow-up and we will send a referral to ENT. If symptoms get worse before the 2 weeks, please follow-up right away.

## 2021-04-19 NOTE — ASSESSMENT & PLAN NOTE
Januvia was recently discontinued due to cost. Patient has been on Metformin and dose was increased to 1000mg BID in January 2021 due to increase in A1C since discontinuation of Januvia. Today A1C is elevated at 7.4 from 6.9. Patient reports limited exercise and irregular eating habits.  declined today, but willing to see her in 3 months if A1C still elevated. Glimepiride was added on with titration instructions. Discussed s/s of hypoglycemia and to follow-up immediately if he notices that. Follow-up in 3 months for in clinic visit with A1C recheck. Patient declined ASA ppx at this time. Will re-evaluate in 3 months.

## 2021-04-20 ENCOUNTER — APPOINTMENT (OUTPATIENT)
Dept: INTERPRETER SERVICES | Facility: CLINIC | Age: 56
End: 2021-04-20

## 2021-05-20 DIAGNOSIS — E11.40 TYPE 2 DIABETES MELLITUS WITH DIABETIC NEUROPATHY, WITHOUT LONG-TERM CURRENT USE OF INSULIN (H): ICD-10-CM

## 2021-07-19 ENCOUNTER — APPOINTMENT (OUTPATIENT)
Dept: INTERPRETER SERVICES | Facility: CLINIC | Age: 56
End: 2021-07-19

## 2021-07-19 ENCOUNTER — OFFICE VISIT (OUTPATIENT)
Dept: FAMILY MEDICINE | Facility: CLINIC | Age: 56
End: 2021-07-19
Payer: COMMERCIAL

## 2021-07-19 ENCOUNTER — TELEPHONE (OUTPATIENT)
Dept: FAMILY MEDICINE | Facility: CLINIC | Age: 56
End: 2021-07-19

## 2021-07-19 VITALS
BODY MASS INDEX: 23.73 KG/M2 | OXYGEN SATURATION: 98 % | WEIGHT: 158.4 LBS | DIASTOLIC BLOOD PRESSURE: 82 MMHG | SYSTOLIC BLOOD PRESSURE: 132 MMHG | HEART RATE: 70 BPM | TEMPERATURE: 97.4 F

## 2021-07-19 DIAGNOSIS — Z78.9 PARTICIPANT IN HEALTH AND WELLNESS PLAN: Primary | ICD-10-CM

## 2021-07-19 DIAGNOSIS — R07.0 THROAT PAIN IN ADULT: ICD-10-CM

## 2021-07-19 DIAGNOSIS — R09.82 POST-NASAL DRIP: ICD-10-CM

## 2021-07-19 DIAGNOSIS — E11.40 TYPE 2 DIABETES MELLITUS WITH DIABETIC NEUROPATHY, WITHOUT LONG-TERM CURRENT USE OF INSULIN (H): Primary | ICD-10-CM

## 2021-07-19 DIAGNOSIS — R21 RASH AND NONSPECIFIC SKIN ERUPTION: ICD-10-CM

## 2021-07-19 DIAGNOSIS — Z87.891 FORMER SMOKER, STOPPED SMOKING IN DISTANT PAST: ICD-10-CM

## 2021-07-19 LAB
ANION GAP SERPL CALCULATED.3IONS-SCNC: 7 MMOL/L (ref 3–14)
BUN SERPL-MCNC: 21 MG/DL (ref 7–30)
CALCIUM SERPL-MCNC: 9.3 MG/DL (ref 8.5–10.1)
CHLORIDE BLD-SCNC: 108 MMOL/L (ref 94–109)
CO2 SERPL-SCNC: 26 MMOL/L (ref 20–32)
CREAT SERPL-MCNC: 0.92 MG/DL (ref 0.66–1.25)
GFR SERPL CREATININE-BSD FRML MDRD: >90 ML/MIN/1.73M2
GLUCOSE BLD-MCNC: 107 MG/DL (ref 70–99)
HBA1C MFR BLD: 6.4 % (ref 0–5.6)
POTASSIUM BLD-SCNC: 4.4 MMOL/L (ref 3.4–5.3)
SODIUM SERPL-SCNC: 141 MMOL/L (ref 133–144)
T4 FREE SERPL-MCNC: 1.16 NG/DL (ref 0.76–1.46)
TSH SERPL DL<=0.005 MIU/L-ACNC: 2.33 MU/L (ref 0.4–4)

## 2021-07-19 PROCEDURE — 83036 HEMOGLOBIN GLYCOSYLATED A1C: CPT | Performed by: NURSE PRACTITIONER

## 2021-07-19 PROCEDURE — 84439 ASSAY OF FREE THYROXINE: CPT | Performed by: NURSE PRACTITIONER

## 2021-07-19 PROCEDURE — 84443 ASSAY THYROID STIM HORMONE: CPT | Performed by: NURSE PRACTITIONER

## 2021-07-19 PROCEDURE — 99214 OFFICE O/P EST MOD 30 MIN: CPT | Performed by: NURSE PRACTITIONER

## 2021-07-19 PROCEDURE — 80048 BASIC METABOLIC PNL TOTAL CA: CPT | Performed by: NURSE PRACTITIONER

## 2021-07-19 PROCEDURE — 36415 COLL VENOUS BLD VENIPUNCTURE: CPT | Performed by: NURSE PRACTITIONER

## 2021-07-19 RX ORDER — CETIRIZINE HYDROCHLORIDE 10 MG/1
10 TABLET ORAL DAILY
Qty: 90 TABLET | Refills: 0 | Status: SHIPPED | OUTPATIENT
Start: 2021-07-19 | End: 2022-01-19

## 2021-07-19 RX ORDER — GLIMEPIRIDE 2 MG/1
2 TABLET ORAL
Qty: 90 TABLET | Refills: 1 | Status: SHIPPED | OUTPATIENT
Start: 2021-07-19 | End: 2022-02-15

## 2021-07-19 ASSESSMENT — ENCOUNTER SYMPTOMS
RESPIRATORY NEGATIVE: 1
CONSTITUTIONAL NEGATIVE: 1
SORE THROAT: 1
GASTROINTESTINAL NEGATIVE: 1
EYE REDNESS: 1
HEARTBURN: 0
CARDIOVASCULAR NEGATIVE: 1

## 2021-07-19 NOTE — PROGRESS NOTES
Assessment & Plan     Type 2 diabetes mellitus with diabetic neuropathy, without long-term current use of insulin (H)  Stable. A1c drawn today - down from 7.4 to 6.4. Continue glimepiride and metformin. BMP drawn. Continue BS checks couple times a week. Follow-up in 3 months.     - Hemoglobin A1c  - glimepiride (AMARYL) 2 MG tablet; Take 1 tablet (2 mg) by mouth every morning (before breakfast)  - Basic metabolic panel  - Hemoglobin A1c; Future    Post-nasal drip  Will trial zyrtec for 3 months and follow-up when he comes in for DM follow-up.     - cetirizine (ZYRTEC) 10 MG tablet; Take 1 tablet (10 mg) by mouth daily    Throat pain in adult  Improved, but not resolved. DDX: PND, thyroid mass, ulceration, throat cancer d/t hx smoking. Patient wants to trial zyrtec first. Recommended EGD, he wants to discuss with wife first. Thyroid labs ordered.     - T4 free; Future  - TSH; Future    Rash and nonspecific skin eruption  Confirmed that hydrocortisone cream was the appropriate cream for chest/legs.     Former smoker, stopped smoking in distant past                 Patient Instructions   Use zyrtec daily to help with post nasal drainage, phlegm that could be causing symptoms. Take this at night time to prevent day time drowsiness.    We can also order an EGD (upper scope) to look at your throat to see if there are any tumors or ulcers that could be causing your throat symptoms if the Zyrtec doesn't help. Let us know after talking to your wife if this is something you would like us to order. Closest location is [facility].     Recommend yearly eye exam with eye doctor due to diabetes. For your dry eyes, I would recommend an over the counter normal saline drop.     Your A1C has improved significantly on the glimepiride. Please continue this medication. I have sent a refill to the pharmacy for 2mg tablets instead of 1mg tablets. The dose hasn't changed. Once you get the 2mg tablets, only take 1 of them.  Finish out your 1mg tablets (taking 2 of them) until you run out of the supply.      Follow-up in 3 months.       Return if symptoms worsen or fail to improve.    MATT Garcia [facility]    Andre Francis is a 56 year old who presents for the following health issues     HPI     Diabetes Follow-up    How often are you checking your blood sugar? A few times a week  What time of day are you checking your blood sugars (select all that apply)?  Before meals  Have you had any blood sugars above 200?  No  Have you had any blood sugars below 70?  No    What symptoms do you notice when your blood sugar is low?  Blurred vision and watery eyes, headaches, discomfort    What concerns do you have today about your diabetes? None and Other: eyes     Do you have any of these symptoms? (Select all that apply)  Blurry vision      BP Readings from Last 2 Encounters:   07/19/21 132/82   04/19/21 122/78     Hemoglobin A1C (%)   Date Value   04/19/2021 7.4 (H)   01/25/2021 6.9 (H)     LDL Cholesterol Calculated (mg/dL)   Date Value   01/25/2021 55   01/22/2020 40           How many servings of fruits and vegetables do you eat daily?  0-1    On average, how many sweetened beverages do you drink each day (Examples: soda, juice, sweet tea, etc.  Do NOT count diet or artificially sweetened beverages)?   0    How many days per week do you exercise enough to make your heart beat faster? 3 or less    How many minutes a day do you exercise enough to make your heart beat faster? 9 or less  How many days per week do you miss taking your medication? 1    What makes it hard for you to take your medications?  remembering to take    Additional provider notes:     DM: A1C was elevated 3 months ago. Added on glimeprizide. Patient states the same. Checking blood glucose a few times a week. Average 107, 116, 94, 110.     Concerned about eyes recently-ongoing. States he was told by eye doctor he has dry  eyes. His dry eyes make his vision blurry at times. He has drops, but states he stopped using them b/c it caused his eyes to be blood shot; eye provider is aware. Last saw eye doctor 2 months ago. Has appt August 16th as follow-up.     Throat pain: stopped taking omeprazole. Throat pain still comes and goes. Improved with throat spray. Feels throat symptoms are more related to drainage, but then also states he sometimes has trouble breathing.     Rash: chest and legs; on and off. Has been using hydrocortisone cream. Brought cream in today to verify he was using the right cream and if he could use it on his legs as well as his chest.     Review of Systems   Constitutional: Negative.    HENT: Positive for postnasal drip and sore throat (at times).    Eyes: Positive for redness (dry eyes) and visual disturbance (2/2 dry eyes).   Respiratory: Negative.    Cardiovascular: Negative.    Gastrointestinal: Negative.  Negative for heartburn (improved).   Skin: Positive for rash (on and off chest/legs).            Objective    /82 (BP Location: Right arm, Patient Position: Sitting, Cuff Size: Adult Regular)   Pulse 70   Temp 97.4  F (36.3  C) (Tympanic)   Wt 71.8 kg (158 lb 6.4 oz)   SpO2 98%   BMI 23.73 kg/m    Body mass index is 23.73 kg/m .  Physical Exam  Vitals and nursing note reviewed.   Constitutional:       General: He is not in acute distress.     Appearance: Normal appearance. He is not ill-appearing or toxic-appearing.   HENT:      Right Ear: Tympanic membrane, ear canal and external ear normal.      Left Ear: Tympanic membrane, ear canal and external ear normal.      Nose: Nose normal.      Mouth/Throat:      Mouth: Mucous membranes are moist.      Pharynx: Oropharynx is clear. Posterior oropharyngeal erythema present.   Neck:      Thyroid: No thyroid mass, thyromegaly or thyroid tenderness.   Cardiovascular:      Rate and Rhythm: Normal rate and regular rhythm.      Pulses: Normal pulses.      Heart  sounds: Normal heart sounds.   Pulmonary:      Effort: Pulmonary effort is normal.      Breath sounds: Normal breath sounds.   Musculoskeletal:         General: Normal range of motion.      Cervical back: Normal range of motion and neck supple. No tenderness.   Lymphadenopathy:      Cervical: No cervical adenopathy.   Skin:     General: Skin is warm and dry.      Findings: No rash.   Neurological:      Mental Status: He is alert and oriented to person, place, and time.   Psychiatric:         Behavior: Behavior normal.            A1C: 6.4 today

## 2021-07-19 NOTE — PROGRESS NOTES
"Discussion:  - Discussed importance of exercise, reported currently walking rapidly   - Education around refined carb's & sugar sources & their impact on glucose levels  -Reported has actively been trying to reduce pasta & fatty foods  - \"Sleep quality & length isn't good\" -Discussed coming back to discuss sleep hygiene ideas, discussed importance of good sleep & its impact on health    Referral:  - American Diabetes Association - diabetes food hub, healthy living section    "

## 2021-07-19 NOTE — TELEPHONE ENCOUNTER
LVM for wife to call back regarding patient visit. Patient had requested I contact wife and update her on what we talked about since she is fluent in English.     Narda Fernandez DNP, NP-C 7/19/2021 3:37 PM

## 2021-07-19 NOTE — PATIENT INSTRUCTIONS
Use zyrtec daily to help with post nasal drainage, phlegm that could be causing symptoms. Take this at night time to prevent day time drowsiness.    We can also order an EGD (upper scope) to look at your throat to see if there are any tumors or ulcers that could be causing your throat symptoms if the Zyrtec doesn't help. Let us know after talking to your wife if this is something you would like us to order. Closest location is [facility].     Recommend yearly eye exam with eye doctor due to diabetes. For your dry eyes, I would recommend an over the counter normal saline drop.     Your A1C has improved significantly on the glimepiride. Please continue this medication. I have sent a refill to the pharmacy for 2mg tablets instead of 1mg tablets. The dose hasn't changed. Once you get the 2mg tablets, only take 1 of them. Finish out your 1mg tablets (taking 2 of them) until you run out of the supply.      Follow-up in 3 months.

## 2021-07-20 ENCOUNTER — TELEPHONE (OUTPATIENT)
Dept: FAMILY MEDICINE | Facility: CLINIC | Age: 56
End: 2021-07-20

## 2021-07-20 NOTE — TELEPHONE ENCOUNTER
LVM for patient spouse to call back (per patient request) regarding visit yesterday and lab results. Phone numbers provided for her to call back.     Narda Fernandez DNP, NP-C 7/20/2021 4:14 PM

## 2021-08-02 ENCOUNTER — APPOINTMENT (OUTPATIENT)
Dept: INTERPRETER SERVICES | Facility: CLINIC | Age: 56
End: 2021-08-02

## 2021-08-31 ENCOUNTER — OFFICE VISIT (OUTPATIENT)
Dept: FAMILY MEDICINE | Facility: CLINIC | Age: 56
End: 2021-08-31
Payer: COMMERCIAL

## 2021-08-31 VITALS — TEMPERATURE: 98.1 F | OXYGEN SATURATION: 98 % | HEART RATE: 66 BPM

## 2021-08-31 DIAGNOSIS — L23.7 CONTACT DERMATITIS DUE TO POISON IVY: Primary | ICD-10-CM

## 2021-08-31 PROCEDURE — 99213 OFFICE O/P EST LOW 20 MIN: CPT | Performed by: NURSE PRACTITIONER

## 2021-08-31 RX ORDER — PREDNISONE 20 MG/1
TABLET ORAL
Qty: 21 TABLET | Refills: 0 | Status: SHIPPED | OUTPATIENT
Start: 2021-08-31 | End: 2022-01-19

## 2021-08-31 NOTE — PROGRESS NOTES
Assessment & Plan     Contact dermatitis due to poison ivy  Rash consistent with poison ivy. Prednisone prescribed and dispensed in clinic due to widespread areas. Side effects, risks and benefits of medication were discussed with patient. Discussed how and when to take medication. Follow-up if symptoms do not improve after prednisone.     - predniSONE (DELTASONE) 20 MG tablet; Take 3 tabs by mouth daily x 3 days, then 2 tabs daily x 3 days, then 1 tab daily x 3 days, then 1/2 tab daily x 4 days.             Patient Instructions   You have poison ivy/oak:  1. This can persist for 10-15 days.    2. Take oral steroids as prescribed. Recommended you take in the morning as it can increase thirst, urination, agitation, and keep you awake if taken too late in the day.  3. Benadryl can be helpful in reducing itching especially helpful at night as it can cause drowsiness.  4. A cold compress may be helpful for comfort in some cases.  5. Prevention is holley in the future. Immediately after contact with the plant (if known contact), wash skin with soap and water and wash clothes immediately with detergent.          Return if symptoms worsen or fail to improve.    MATT Garcia CNP  [facility]    Andre Francis is a 56 year old who presents for the following health issues with wife in room    HPI     Rash  Onset/Duration: 5 days  Description  Location: both legs and arms  Character: blotchy, raised, draining, red  Itching: moderate  Intensity:  moderate  Progression of Symptoms:  worsening  Accompanying signs and symptoms:   Fever: no  Body aches or joint pain: no  Sore throat symptoms: no  Recent cold symptoms: no  History:           Previous episodes of similar rash: None  New exposures:  None  Recent travel: no  Exposure to similar rash: no  Precipitating or alleviating factors:   Therapies tried and outcome: hydrocortisone cream -  not effective and Ivarest poison Ivy  cream    Additional provider notes: Rash started last Friday. States he did do lawn work over the weekend. He is unaware of being around any poison ivy.    Review of Systems   Constitutional: Negative.    Skin: Positive for rash.            Objective    Pulse 66   Temp 98.1  F (36.7  C) (Tympanic)   SpO2 98%   There is no height or weight on file to calculate BMI.  Physical Exam  Vitals and nursing note reviewed.   Constitutional:       General: He is not in acute distress.     Appearance: Normal appearance. He is not ill-appearing or toxic-appearing.   Skin:     General: Skin is warm and dry.      Findings: Rash (bilat leg and arm linear rash consistent with poison ivy) present.   Neurological:      Mental Status: He is alert and oriented to person, place, and time.   Psychiatric:         Behavior: Behavior normal.

## 2021-08-31 NOTE — PATIENT INSTRUCTIONS
You have poison ivy/oak:  1. This can persist for 10-15 days.    2. Take oral steroids as prescribed. Recommended you take in the morning as it can increase thirst, urination, agitation, and keep you awake if taken too late in the day.  3. Benadryl can be helpful in reducing itching especially helpful at night as it can cause drowsiness.  4. A cold compress may be helpful for comfort in some cases.  5. Prevention is holley in the future. Immediately after contact with the plant (if known contact), wash skin with soap and water and wash clothes immediately with detergent.

## 2021-09-14 ASSESSMENT — ENCOUNTER SYMPTOMS: CONSTITUTIONAL NEGATIVE: 1

## 2021-09-28 DIAGNOSIS — E11.40 TYPE 2 DIABETES MELLITUS WITH DIABETIC NEUROPATHY, WITHOUT LONG-TERM CURRENT USE OF INSULIN (H): ICD-10-CM

## 2021-10-20 ENCOUNTER — OFFICE VISIT (OUTPATIENT)
Dept: FAMILY MEDICINE | Facility: CLINIC | Age: 56
End: 2021-10-20
Payer: COMMERCIAL

## 2021-10-20 VITALS
SYSTOLIC BLOOD PRESSURE: 132 MMHG | BODY MASS INDEX: 24.27 KG/M2 | WEIGHT: 162 LBS | OXYGEN SATURATION: 99 % | DIASTOLIC BLOOD PRESSURE: 68 MMHG | HEART RATE: 69 BPM

## 2021-10-20 DIAGNOSIS — E11.40 TYPE 2 DIABETES MELLITUS WITH DIABETIC NEUROPATHY, WITHOUT LONG-TERM CURRENT USE OF INSULIN (H): Primary | ICD-10-CM

## 2021-10-20 DIAGNOSIS — E78.5 HYPERLIPIDEMIA LDL GOAL <70: ICD-10-CM

## 2021-10-20 DIAGNOSIS — K21.9 GASTROESOPHAGEAL REFLUX DISEASE WITHOUT ESOPHAGITIS: ICD-10-CM

## 2021-10-20 PROBLEM — R07.0 THROAT PAIN IN ADULT: Status: RESOLVED | Noted: 2021-04-19 | Resolved: 2021-10-20

## 2021-10-20 LAB — HBA1C MFR BLD: 6.6 % (ref 0–5.6)

## 2021-10-20 PROCEDURE — 36415 COLL VENOUS BLD VENIPUNCTURE: CPT

## 2021-10-20 PROCEDURE — 99213 OFFICE O/P EST LOW 20 MIN: CPT | Performed by: NURSE PRACTITIONER

## 2021-10-20 PROCEDURE — 83036 HEMOGLOBIN GLYCOSYLATED A1C: CPT

## 2021-10-20 RX ORDER — PANTOPRAZOLE SODIUM 40 MG/1
40 TABLET, DELAYED RELEASE ORAL DAILY
Qty: 30 TABLET | Refills: 0 | Status: SHIPPED | OUTPATIENT
Start: 2021-10-20 | End: 2022-01-19 | Stop reason: ALTCHOICE

## 2021-10-20 ASSESSMENT — ENCOUNTER SYMPTOMS: HEARTBURN: 1

## 2021-10-20 NOTE — ASSESSMENT & PLAN NOTE
Recent Labs   Lab Test 01/25/21  0724 01/22/20  1031   CHOL 166 124   HDL 72 60   LDL 55 40   TRIG 194* 122     Will recheck Lipids in the next 3 month visit.  Patient is to come in fasting.  Takes atorvastatin 10 mg.

## 2021-10-20 NOTE — ASSESSMENT & PLAN NOTE
You are out of Prilosec in the cabinet so instead I gave him pantoprazole 40 mg.  We can see if that works well for him on the days that he needs it.  Can reorder additional medication as needed.

## 2021-10-20 NOTE — PROGRESS NOTES
Assessment & Plan   Problem List Items Addressed This Visit        Nervous and Auditory    Type 2 diabetes mellitus with diabetic neuropathy, without long-term current use of insulin (H) - Primary     Hemoglobin A1C   Date Value Ref Range Status   10/20/2021 6.6 (H) 0.0 - 5.6 % Final     Comment:     Normal <5.7%   Prediabetes 5.7-6.4%    Diabetes 6.5% or higher     Note: Adopted from ADA consensus guidelines.   04/19/2021 7.4 (H) 0 - 5.6 % Final     Comment:     Normal <5.7% Prediabetes 5.7-6.4%  Diabetes 6.5% or higher - adopted from ADA   consensus guidelines.       Glimepiride 2 mg daily  Metformin 1000 mg twice daily denies side effects of these drugs.  Discussed etiology of diabetes and how to avoid carbohydrates type foods like tortillas, beans, rice because his body is unable to tolerate them.  He used to be on Januvia however it was cost prohibitive for him.  He has a history of left retinal detachment but is unable to see out of the left eye.  Takes atorvastatin 10 mg.  Need to check with him regarding taking a baby aspirin daily on his next visit.  We will also need to check microalbumin.  Recommend a low carbohydrate diet.           Relevant Orders    Hemoglobin A1c (Completed)    Albumin Random Urine Quantitative with Creat Ratio    **A1C FUTURE 3mo       Digestive    Gastroesophageal reflux disease without esophagitis     You are out of Prilosec in the cabinet so instead I gave him pantoprazole 40 mg.  We can see if that works well for him on the days that he needs it.  Can reorder additional medication as needed.         Relevant Medications    pantoprazole (PROTONIX) 40 MG EC tablet       Endocrine    Hyperlipidemia LDL goal <70     Recent Labs   Lab Test 01/25/21  0724 01/22/20  1031   CHOL 166 124   HDL 72 60   LDL 55 40   TRIG 194* 122     Will recheck Lipids in the next 3 month visit.  Patient is to come in fasting.  Takes atorvastatin 10 mg.             Relevant Orders    Lipid panel reflex  to direct LDL Fasting             Return in about 3 months (around 1/20/2022) for Follow up.    MATT Rivas CNP  [facility]    Andre Francis is a 56 year old who presents for the following health issues    HPI     Diabetes Follow-up    How often are you checking your blood sugar? Two times daily  Blood sugar testing frequency justification:  Patient modifying lifestyle changes (diet, exercise) with blood sugars  What time of day are you checking your blood sugars (select all that apply)?  Before meals  Have you had any blood sugars above 200?  Yes had one at 207  Have you had any blood sugars below 70?  No    What symptoms do you notice when your blood sugar is low?  None    What concerns do you have today about your diabetes? None     Do you have any of these symptoms? (Select all that apply)  No numbness or tingling in feet.  No redness, sores or blisters on feet.  No complaints of excessive thirst.  No reports of blurry vision.  No significant changes to weight.    Have you had a diabetic eye exam in the last 12 months? No        BP Readings from Last 2 Encounters:   07/19/21 132/82   04/19/21 122/78     Hemoglobin A1C (%)   Date Value   07/19/2021 6.4 (H)   04/19/2021 7.4 (H)   01/25/2021 6.9 (H)     LDL Cholesterol Calculated (mg/dL)   Date Value   01/25/2021 55   01/22/2020 40           How many servings of fruits and vegetables do you eat daily?  0-1    On average, how many sweetened beverages do you drink each day (Examples: soda, juice, sweet tea, etc.  Do NOT count diet or artificially sweetened beverages)?   1    How many days per week do you exercise enough to make your heart beat faster? 3 or less    How many minutes a day do you exercise enough to make your heart beat faster? 20 - 29  How many days per week do you miss taking your medication? 1    What makes it hard for you to take your medications?  Just forgetting to take    Here for DMt2  management.  The patient's A1c's have recently dropped with the addition of glimepiride 2 mg daily, Metformin 1000 mg twice daily.  Does not complain of any side effects of the drugs.   there is a language barrier.  He states his English understanding is not very good, We spoke in Finnish however my Finnish is somewhat limited.  He states that he needs more medication for heartburn and throat pain.  In the past we have used omeprazole which does seem to work well he states he does not have to use it every day but it does seem to work on the days that he does have heartburn.    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   Gastrointestinal: Positive for heartburn.   All other systems reviewed and are negative.           Objective    /68 (BP Location: Left arm, Patient Position: Sitting, Cuff Size: Adult Regular)   Pulse 69   Wt 73.5 kg (162 lb)   SpO2 99%   BMI 24.27 kg/m    Body mass index is 24.27 kg/m .  Physical Exam  Constitutional:       Appearance: Normal appearance.   Skin:     General: Skin is warm and dry.   Neurological:      Mental Status: He is alert and oriented to person, place, and time.   Psychiatric:         Mood and Affect: Mood normal.         Behavior: Behavior normal.         Thought Content: Thought content normal.         Judgment: Judgment normal.

## 2021-10-20 NOTE — ASSESSMENT & PLAN NOTE
Hemoglobin A1C   Date Value Ref Range Status   10/20/2021 6.6 (H) 0.0 - 5.6 % Final     Comment:     Normal <5.7%   Prediabetes 5.7-6.4%    Diabetes 6.5% or higher     Note: Adopted from ADA consensus guidelines.   04/19/2021 7.4 (H) 0 - 5.6 % Final     Comment:     Normal <5.7% Prediabetes 5.7-6.4%  Diabetes 6.5% or higher - adopted from ADA   consensus guidelines.       Glimepiride 2 mg daily  Metformin 1000 mg twice daily denies side effects of these drugs.  Discussed etiology of diabetes and how to avoid carbohydrates type foods like tortillas, beans, rice because his body is unable to tolerate them.  He used to be on Januvia however it was cost prohibitive for him.  He has a history of left retinal detachment but is unable to see out of the left eye.  Takes atorvastatin 10 mg.  Need to check with him regarding taking a baby aspirin daily on his next visit.  We will also need to check microalbumin.  Recommend a low carbohydrate diet.

## 2022-01-19 ENCOUNTER — OFFICE VISIT (OUTPATIENT)
Dept: FAMILY MEDICINE | Facility: CLINIC | Age: 57
End: 2022-01-19
Payer: COMMERCIAL

## 2022-01-19 VITALS
RESPIRATION RATE: 14 BRPM | DIASTOLIC BLOOD PRESSURE: 74 MMHG | OXYGEN SATURATION: 98 % | BODY MASS INDEX: 26.36 KG/M2 | TEMPERATURE: 97.9 F | HEART RATE: 64 BPM | WEIGHT: 164 LBS | SYSTOLIC BLOOD PRESSURE: 134 MMHG | HEIGHT: 66 IN

## 2022-01-19 DIAGNOSIS — E78.5 HYPERLIPIDEMIA LDL GOAL <70: ICD-10-CM

## 2022-01-19 DIAGNOSIS — E11.40 TYPE 2 DIABETES MELLITUS WITH DIABETIC NEUROPATHY, WITHOUT LONG-TERM CURRENT USE OF INSULIN (H): ICD-10-CM

## 2022-01-19 DIAGNOSIS — Z28.20 VACCINE REFUSED BY PATIENT: ICD-10-CM

## 2022-01-19 DIAGNOSIS — K21.9 GASTROESOPHAGEAL REFLUX DISEASE WITHOUT ESOPHAGITIS: Primary | ICD-10-CM

## 2022-01-19 LAB
CHOLEST SERPL-MCNC: 131 MG/DL
CREAT UR-MCNC: 125 MG/DL
FASTING STATUS PATIENT QL REPORTED: YES
HBA1C MFR BLD: 6.3 % (ref 0–5.6)
HDLC SERPL-MCNC: 55 MG/DL
LDLC SERPL CALC-MCNC: 46 MG/DL
MICROALBUMIN UR-MCNC: 8 MG/L
MICROALBUMIN/CREAT UR: 6.4 MG/G CR (ref 0–17)
NONHDLC SERPL-MCNC: 76 MG/DL
TRIGL SERPL-MCNC: 149 MG/DL

## 2022-01-19 PROCEDURE — 80061 LIPID PANEL: CPT | Performed by: NURSE PRACTITIONER

## 2022-01-19 PROCEDURE — 83036 HEMOGLOBIN GLYCOSYLATED A1C: CPT | Performed by: NURSE PRACTITIONER

## 2022-01-19 PROCEDURE — 82043 UR ALBUMIN QUANTITATIVE: CPT | Performed by: NURSE PRACTITIONER

## 2022-01-19 PROCEDURE — 36415 COLL VENOUS BLD VENIPUNCTURE: CPT

## 2022-01-19 PROCEDURE — 99214 OFFICE O/P EST MOD 30 MIN: CPT | Performed by: NURSE PRACTITIONER

## 2022-01-19 RX ORDER — ASPIRIN 81 MG/1
81 TABLET ORAL DAILY
Qty: 90 TABLET | Refills: 3 | Status: SHIPPED | OUTPATIENT
Start: 2022-01-19

## 2022-01-19 RX ORDER — PANTOPRAZOLE SODIUM 40 MG/1
40 TABLET, DELAYED RELEASE ORAL DAILY
Qty: 90 TABLET | Refills: 3 | Status: SHIPPED | OUTPATIENT
Start: 2022-01-19 | End: 2022-04-20

## 2022-01-19 ASSESSMENT — MIFFLIN-ST. JEOR: SCORE: 1506.65

## 2022-01-19 NOTE — ASSESSMENT & PLAN NOTE
Intermittent heartburn.  Triggers seem to be fatty foods.  Protonix 40 mg was given to him last time which seemed to work well for him.  A prescription was sent to the mail order pharmacy for 1 year.

## 2022-01-19 NOTE — LETTER
January 24, 2022      Tito Diaz  1935 [facility] [address]        Dear ,    Estamos escritando de decirle giuliana resultados de los pruebes laboratorio.     Los rinones estan funcionanodo beulah. Hemoglobin A1c es un brigida de glucosa de armando promedio de 3 meses; es mejor que la ultima vez que lo habiamos probado y es en el nivel de prediabetes. El colesterol tambien es senior.     Resulted Orders   Lipid panel reflex to direct LDL Fasting   Result Value Ref Range    Cholesterol 131 <200 mg/dL    Triglycerides 149 <150 mg/dL    Direct Measure HDL 55 >=40 mg/dL    LDL Cholesterol Calculated 46 <=100 mg/dL    Non HDL Cholesterol 76 <130 mg/dL    Patient Fasting > 8hrs? Yes     Narrative    Cholesterol  Desirable:  <200 mg/dL    Triglycerides  Normal:  Less than 150 mg/dL  Borderline High:  150-199 mg/dL  High:  200-499 mg/dL  Very High:  Greater than or equal to 500 mg/dL    Direct Measure HDL  Female:  Greater than or equal to 50 mg/dL   Male:  Greater than or equal to 40 mg/dL    LDL Cholesterol  Desirable:  <100mg/dL  Above Desirable:  100-129 mg/dL   Borderline High:  130-159 mg/dL   High:  160-189 mg/dL   Very High:  >= 190 mg/dL    Non HDL Cholesterol  Desirable:  130 mg/dL  Above Desirable:  130-159 mg/dL  Borderline High:  160-189 mg/dL  High:  190-219 mg/dL  Very High:  Greater than or equal to 220 mg/dL   **A1C FUTURE 3mo   Result Value Ref Range    Hemoglobin A1C 6.3 (H) 0.0 - 5.6 %      Comment:      Normal <5.7%   Prediabetes 5.7-6.4%    Diabetes 6.5% or higher     Note: Adopted from ADA consensus guidelines.   Albumin Random Urine Quantitative with Creat Ratio   Result Value Ref Range    Creatinine Urine mg/dL 125 mg/dL    Albumin Urine mg/L 8 mg/L    Albumin Urine mg/g Cr 6.40 0.00 - 17.00 mg/g Cr         Sincerely,      Lyubov Meyers, MATT CNP

## 2022-01-19 NOTE — PROGRESS NOTES
"  Assessment & Plan   Problem List Items Addressed This Visit        Nervous and Auditory    Type 2 diabetes mellitus with diabetic neuropathy, without long-term current use of insulin (H)     Lab Results   Component Value Date    A1C 6.3 01/19/2022    A1C 6.6 10/20/2021    A1C 6.4 07/19/2021    A1C 7.4 04/19/2021    A1C 6.9 01/25/2021    A1C 6.6 10/26/2020    A1C 6.7 07/27/2020    A1C 6.5 01/22/2020     A1c has improved. DM eye exam was done 2 days ago with his eye doctor. No complaints of sores, lesions or neuropathy in his feet. DM foot exam done today.   A1c, Lipids, and micro albumin labs done today.   Medications include metformin 1000 mg twice daily, glimepiride 2 mg once daily  Nutrition: Recommend a low carbohydrate eating pattern avoidance of starches, grains, sugars.  Started aspirin 81 mg once daily for prevention of strokes, heart attack         Relevant Medications    aspirin 81 MG EC tablet       Digestive    Gastroesophageal reflux disease without esophagitis - Primary     Intermittent heartburn.  Triggers seem to be fatty foods.  Protonix 40 mg was given to him last time which seemed to work well for him.  A prescription was sent to the mail order pharmacy for 1 year.         Relevant Medications    pantoprazole (PROTONIX) 40 MG EC tablet       Endocrine    Hyperlipidemia LDL goal <70     Rechecked lipids today.   Occasions include atorvastatin 10 mg daily            Other    Vaccine refused by patient     Refused Tetanus and Shingles vaccine.               Health Maintenance: discussed lung cancer screening but patient declined today.      BMI:   Estimated body mass index is 26.47 kg/m  as calculated from the following:    Height as of this encounter: 1.676 m (5' 6\").    Weight as of this encounter: 74.4 kg (164 lb).     Return in about 3 months (around 4/19/2022) for Follow up.    MATT Rivas CNP  [facility]    Subjective   Tito is a 56 year old who " presents for the following health issues    HPI     Diabetes Follow-up    How often are you checking your blood sugar? One time daily  What time of day are you checking your blood sugars (select all that apply)?  Before meals  Have you had any blood sugars above 200?  No  Have you had any blood sugars below 70?  No    What symptoms do you notice when your blood sugar is low?  Lethargy    What concerns do you have today about your diabetes? None     Do you have any of these symptoms? (Select all that apply)  No numbness or tingling in feet.  No redness, sores or blisters on feet.  No complaints of excessive thirst.  No reports of blurry vision.  No significant changes to weight.    Have you had a diabetic eye exam in the last 12 months?     Diabetes; will recheck a1c, micro albumin, and lipids today. No changes. The medicines are not causing any side effects for him. He had his eye appointment 2 days ago. No changes in sensation in his feet or sores.     BP Readings from Last 2 Encounters:   01/19/22 134/74   10/20/21 132/68     Hemoglobin A1C POCT (%)   Date Value   04/19/2021 7.4 (H)   01/25/2021 6.9 (H)     Hemoglobin A1C (%)   Date Value   10/20/2021 6.6 (H)   07/19/2021 6.4 (H)     LDL Cholesterol Calculated (mg/dL)   Date Value   01/25/2021 55   01/22/2020 40       Hyperlipidemia Follow-Up      Are you regularly taking any medication or supplement to lower your cholesterol?   Yes    Are you having muscle aches or other side effects that you think could be caused by your cholesterol lowering medication?  No      How many servings of fruits and vegetables do you eat daily?  2-3    On average, how many sweetened beverages do you drink each day (Examples: soda, juice, sweet tea, etc.  Do NOT count diet or artificially sweetened beverages)?   0    How many days per week do you exercise enough to make your heart beat faster? 4    How many minutes a day do you exercise enough to make your heart beat faster? 30 -  "60  How many days per week do you miss taking your medication? 1    What makes it hard for you to take your medications?  remembering to take    Will reheck labs today. Taking atorvastatin  GERD- at times has heartburn. Pantoprazole seems like it works for him. He states fatty foods are triggers.     Review of Systems   Constitutional, HEENT, cardiovascular, pulmonary, gi and gu systems are negative, except as otherwise noted.      Objective    /74 (BP Location: Left arm, Patient Position: Sitting, Cuff Size: Adult Large)   Pulse 64   Temp 97.9  F (36.6  C) (Tympanic)   Resp 14   Ht 1.676 m (5' 6\")   Wt 74.4 kg (164 lb)   SpO2 98%   BMI 26.47 kg/m    Body mass index is 26.47 kg/m .  Physical Exam  Constitutional:       Appearance: Normal appearance. He is normal weight.   Musculoskeletal:      Right foot: Normal.      Left foot: Normal.      Comments: Has sensation in both dull and sharp. No sores or lesions in bilateral feet.    Skin:     General: Skin is warm and dry.   Neurological:      Mental Status: He is alert and oriented to person, place, and time.   Psychiatric:         Mood and Affect: Mood normal.         Behavior: Behavior normal.         Thought Content: Thought content normal.         Judgment: Judgment normal.              "

## 2022-01-19 NOTE — ASSESSMENT & PLAN NOTE
Lab Results   Component Value Date    A1C 6.3 01/19/2022    A1C 6.6 10/20/2021    A1C 6.4 07/19/2021    A1C 7.4 04/19/2021    A1C 6.9 01/25/2021    A1C 6.6 10/26/2020    A1C 6.7 07/27/2020    A1C 6.5 01/22/2020     A1c has improved. DM eye exam was done 2 days ago with his eye doctor. No complaints of sores, lesions or neuropathy in his feet. DM foot exam done today.   A1c, Lipids, and micro albumin labs done today.   Medications include metformin 1000 mg twice daily, glimepiride 2 mg once daily  Nutrition: Recommend a low carbohydrate eating pattern avoidance of starches, grains, sugars.  Started aspirin 81 mg once daily for prevention of strokes, heart attack

## 2022-02-14 DIAGNOSIS — E11.40 TYPE 2 DIABETES MELLITUS WITH DIABETIC NEUROPATHY, WITHOUT LONG-TERM CURRENT USE OF INSULIN (H): ICD-10-CM

## 2022-02-15 RX ORDER — GLIMEPIRIDE 2 MG/1
2 TABLET ORAL
Qty: 90 TABLET | Refills: 3 | Status: SHIPPED | OUTPATIENT
Start: 2022-02-15 | End: 2022-07-13 | Stop reason: ALTCHOICE

## 2022-02-21 ENCOUNTER — OFFICE VISIT (OUTPATIENT)
Dept: FAMILY MEDICINE | Facility: CLINIC | Age: 57
End: 2022-02-21
Payer: COMMERCIAL

## 2022-02-21 VITALS
OXYGEN SATURATION: 98 % | DIASTOLIC BLOOD PRESSURE: 76 MMHG | HEIGHT: 66 IN | RESPIRATION RATE: 16 BRPM | SYSTOLIC BLOOD PRESSURE: 128 MMHG | BODY MASS INDEX: 26.03 KG/M2 | HEART RATE: 81 BPM | TEMPERATURE: 97.1 F | WEIGHT: 162 LBS

## 2022-02-21 DIAGNOSIS — G47.00 INSOMNIA, UNSPECIFIED TYPE: ICD-10-CM

## 2022-02-21 DIAGNOSIS — E11.40 TYPE 2 DIABETES MELLITUS WITH DIABETIC NEUROPATHY, WITHOUT LONG-TERM CURRENT USE OF INSULIN (H): Primary | ICD-10-CM

## 2022-02-21 DIAGNOSIS — M79.2 ARM NEURALGIA: ICD-10-CM

## 2022-02-21 DIAGNOSIS — R10.9 CRAMP, ABDOMINAL: ICD-10-CM

## 2022-02-21 PROCEDURE — 99214 OFFICE O/P EST MOD 30 MIN: CPT | Performed by: NURSE PRACTITIONER

## 2022-02-21 RX ORDER — GABAPENTIN 100 MG/1
100 CAPSULE ORAL 3 TIMES DAILY
Qty: 30 CAPSULE | Refills: 0 | Status: SHIPPED | OUTPATIENT
Start: 2022-02-21

## 2022-02-21 NOTE — PROGRESS NOTES
"Assessment/Plan:   1. Type 2 diabetes mellitus with diabetic neuropathy, without long-term current use of insulin (H)  Weight gain with Glimepiride; I don't like the long term safety profile of the Sulfonorrheas either; so I will start with Jardiance to see how much insurance will pay because this is a drug that has a much greater safety profile for diabetes; it also has a superior mechanism of action of urinating excess glucose and will help with weight loss.   - empagliflozin (JARDIANCE) 10 MG TABS tablet; Take 1 tablet (10 mg) by mouth daily  Dispense: 60 tablet; Refill: 0    2. Arm neuralgia  Likely transient but if no improvement recommend follow up with neurology.   - gabapentin (NEURONTIN) 100 MG capsule; Take 1 capsule (100 mg) by mouth 3 times daily  Dispense: 30 capsule; Refill: 0    3. Insomnia, unspecified type  Sleep meditations in [place]    4. Cramp, abdominal  Magnesium 250 mg daily to help with cramping.         Return in about 3 months (around 5/21/2022) for Follow up.      Lyubov Mendoza, APRN, CNP    Subjective:   Tito Das is a 56 year old male who presents today in the clinic with multiple complaints.    Finger numbness and tingling: This morning when he woke up he developed symptoms of a \"tickle\" in his left arm and down to the finger tips.  We are speaking in [place]ian; so I take his meaning 'Numbness and tingling\"The sensation has improved in his arm but now he continues to have the sensations in the tips of his fingers.  He is not aware of any sleeping on his arm funny last night.  He states that his blood sugar was 120 this morning when he woke up.      Intermittent cramping: He has been having intermittent cramping of his trunk below the ribs he does not have any cramping anywhere else in the legs or arms.  But does occur every once in a while.    Weight gain: He has noticed that he has gained weight and he does not like how it looks on his chest he feels that his breast tissue has " enlarged and he gets embarrassed about that we noticed that his weight gain start started in 4/2021 he started at 146 pounds and now he weighs 164.  He feels that it was correlated with when he started glimepiride.  According to the provider notes it does look like he was started on 1 mg of glimepiride in April 2021 then increase to 2 mg of glimepiride on July 2021.    Intermittent pulsations of his body: Symptoms are intermittent typically when he lays down at nighttime he will notice a pulsing sensation once a different location every time.  Locations include arms, legs, neck, abdomen.  He denies heart palpitations, shortness of breath, chest pains with this it tends to linger until he falls asleep at nighttime.  He tends to have difficulty falling asleep, usually lasting 1-2 hours before he actually falls asleep.  He attributes it to racing thoughts, feeling bored about his life.  He is unable to see and unable to maintain a job.  His wife works all day to support them and he is just home hanging out.      Patient Active Problem List   Diagnosis     Anxiety state     Diabetic peripheral neuropathy (H)     Hyperlipidemia LDL goal <70     Left retinal detachment     Vision loss, left eye     Vitamin D deficiency     Type 2 diabetes mellitus with diabetic neuropathy, without long-term current use of insulin (H)     Former smoker, stopped smoking in distant past     Gastroesophageal reflux disease without esophagitis     Vaccine refused by patient       Current Outpatient Medications:      acetaminophen (TYLENOL) 325 MG tablet, Take 325-650 mg by mouth every 6 hours as needed for mild pain, Disp: , Rfl:      aspirin 81 MG EC tablet, Take 1 tablet (81 mg) by mouth daily, Disp: 90 tablet, Rfl: 3     atorvastatin (LIPITOR) 10 MG tablet, Take 1 tablet (10 mg) by mouth daily, Disp: 90 tablet, Rfl: 3     blood glucose (ONETOUCH ULTRA) test strip, 1 strip by In Vitro route 4 times daily, Disp: 420 each, Rfl: 2      "Cholecalciferol (VITAMIN D3) 50 MCG (2000 UT) CAPS, Take 1,000 Units by mouth, Disp: , Rfl:      glimepiride (AMARYL) 2 MG tablet, Take 1 tablet (2 mg) by mouth every morning (before breakfast), Disp: 90 tablet, Rfl: 3     lancets 28G MISC, , Disp: , Rfl:      Lancets 30G MISC, Use to test blood sugar daily as directed, Disp: , Rfl:      metFORMIN (GLUCOPHAGE) 500 MG tablet, Take 2 tablets (1,000 mg) by mouth 2 times daily (with meals), Disp: 360 tablet, Rfl: 3     pantoprazole (PROTONIX) 40 MG EC tablet, Take 1 tablet (40 mg) by mouth daily, Disp: 90 tablet, Rfl: 3  Past Medical History:   Diagnosis Date     Diabetes mellitus, type 2 (H)      Throat pain in adult 4/19/2021     No Known Allergies    ROS   ROS: 10 point ROS neg other than the symptoms noted above in the HPI.      Objective:  /76 (BP Location: Left arm, Patient Position: Sitting, Cuff Size: Adult Large)   Pulse 81   Temp 97.1  F (36.2  C) (Tympanic)   Resp 16   Ht 1.664 m (5' 5.5\")   Wt 73.5 kg (162 lb)   SpO2 98%   BMI 26.55 kg/m    General: Patient appears to be in no acute distress.  MentalStatus: cooperative and well groomed. Cognitive: Oriented to time, place, and person. Reasoning and memory intact. Normal affect. Speech clear and well enunciated.  Upper extremities: normal strength against resistance. He can feel sensation in bilateral finger tips, no changes in warmth, swelling, edema.             "

## 2022-03-29 ENCOUNTER — OFFICE VISIT (OUTPATIENT)
Dept: FAMILY MEDICINE | Facility: CLINIC | Age: 57
End: 2022-03-29
Payer: COMMERCIAL

## 2022-03-29 VITALS
SYSTOLIC BLOOD PRESSURE: 118 MMHG | WEIGHT: 158.4 LBS | HEIGHT: 65 IN | DIASTOLIC BLOOD PRESSURE: 72 MMHG | BODY MASS INDEX: 26.39 KG/M2

## 2022-03-29 DIAGNOSIS — Z00.8 ENCOUNTER FOR BIOMETRIC SCREENING: Primary | ICD-10-CM

## 2022-03-29 PROCEDURE — 99207 PR NO CHARGE LOS: CPT | Performed by: NURSE PRACTITIONER

## 2022-03-29 NOTE — PROGRESS NOTES
Biometric screening completed today.     Needs  follow-up.     Recommended annual physical and colonoscopy.    Narda Fernandez DNP, NP-C

## 2022-03-31 LAB
CHOLEST SERPL-MCNC: 137 MG/DL
GLUCOSE BLD-MCNC: 146 MG/DL (ref 79–116)
HDLC SERPL-MCNC: 59 MG/DL
LDLC SERPL CALC-MCNC: 59 MG/DL
NONHDLC SERPL-MCNC: 78 MG/DL
TRIGL SERPL-MCNC: 97 MG/DL

## 2022-03-31 PROCEDURE — 36416 COLLJ CAPILLARY BLOOD SPEC: CPT | Performed by: NURSE PRACTITIONER

## 2022-03-31 NOTE — PATIENT INSTRUCTIONS
"Thank you for completing your biometric screening on 3/29/2022.    Your laboratory results from this visit are:    Triglycerides (TRG) measures the amount of fat in your bloodstream. High levels may raise the risk of heart disease, especially in women.  Normal = under 150 mg/dl  Abnormal = over 150 mg/dl   Your value:   Triglycerides   Date Value Ref Range Status   03/31/2022 97 <150 mg/dL Final   01/25/2021 194 (H) <150 mg/dL Final     Comment:     Borderline high:  150-199 mg/dl  High:             200-499 mg/dl  Very high:       >499 mg/dl  Fasting specimen          Fasting glucose measures the sugar circulating in your blood stream which is used for energy by all organs, particularly the brain and muscles.    Normal = 70 - 99 mg/dl  Prediabetes = 100 - 125 mg/dl  Diabetes = more than 125 mg/dl   Your value:   Glucose   Date Value Ref Range Status   07/19/2021 107 (H) 70 - 99 mg/dL Final   07/27/2020 97 70 - 99 mg/dL Final     Comment:     Fasting specimen        Total cholesterol measures the total amount of cholesterol in your blood. High cholesterol levels can indicate fatty buildup in your arteries.  Normal = under 200 mg/dl   Borderline = 200 - 239 mg/dl  Abnormal = over 239 mg/dl   Your value:   Cholesterol   Date Value Ref Range Status   03/31/2022 137 <200 mg/dL Final   01/25/2021 166 <200 mg/dL Final        High Density Lipoprotein (HDL) is a measurement of good fat.  A low HDL puts you at higher risk for coronary disease.  Normal Men: Above 39 mg/dl  Normal Women: Above 49 mg/dl  Your value:   HDL Cholesterol   Date Value Ref Range Status   01/25/2021 72 >39 mg/dL Final     Direct Measure HDL   Date Value Ref Range Status   03/31/2022 59 >=40 mg/dL Final       Low Density Lipoprotein (LDL) measures the type of cholesterol is referred to by some as \"bad cholesterol.\"  An elevated LDL puts you at a greater risk for coronary disease.  Normal = under 100 mg/dl  Borderline = 100 - 129 mg/dl  Abnormal = above " 129 mg/dl   Your value:   LDL Cholesterol Calculated   Date Value Ref Range Status   03/31/2022 59 <=100 mg/dL Final   01/25/2021 55 <100 mg/dL Final     Comment:     Desirable:       <100 mg/dl       Follow-Up    We recommend the following steps based on today's results:  Follow-up with your  to discuss your abnormal results. You can schedule with your [employer]  by calling [phone] and selecting option 3 or emailing [email or web address]). You must complete this visit to qualify for the wellness program.    As a reminder, you may also be due for the following screening(s):  Colonoscopy    If you are due, please call your PCP to schedule.

## 2022-04-20 ENCOUNTER — OFFICE VISIT (OUTPATIENT)
Dept: FAMILY MEDICINE | Facility: CLINIC | Age: 57
End: 2022-04-20
Payer: COMMERCIAL

## 2022-04-20 VITALS
WEIGHT: 155 LBS | BODY MASS INDEX: 25.79 KG/M2 | HEART RATE: 67 BPM | OXYGEN SATURATION: 98 % | DIASTOLIC BLOOD PRESSURE: 66 MMHG | SYSTOLIC BLOOD PRESSURE: 110 MMHG

## 2022-04-20 DIAGNOSIS — E11.40 TYPE 2 DIABETES MELLITUS WITH DIABETIC NEUROPATHY, WITHOUT LONG-TERM CURRENT USE OF INSULIN (H): Primary | ICD-10-CM

## 2022-04-20 LAB — HBA1C MFR BLD: 7.1 % (ref 0–5.6)

## 2022-04-20 PROCEDURE — 83036 HEMOGLOBIN GLYCOSYLATED A1C: CPT

## 2022-04-20 PROCEDURE — 99214 OFFICE O/P EST MOD 30 MIN: CPT | Performed by: NURSE PRACTITIONER

## 2022-04-20 PROCEDURE — 36415 COLL VENOUS BLD VENIPUNCTURE: CPT

## 2022-04-20 NOTE — ASSESSMENT & PLAN NOTE
A1c is 7.1 today; it is up from January. I would recommend that he start Jardiance. He never started it last time when he discontinued Glimepiride. I recommend he use it daily to keep sugars down. Counseled on side effects of increased UTIs in men but overall it is a great drug to help improve overall diabetes with excretion of glucose through the kidneys and urine if he doesn't have increased UTIs/yeast infections.

## 2022-04-20 NOTE — PROGRESS NOTES
Assessment/Plan:   1. Type 2 diabetes mellitus with diabetic neuropathy, without long-term current use of insulin (H)  A1c is 7.1 today; it is up from January. I would recommend that he start Jardiance. He never started it last time when he discontinued Glimepiride. I recommend he use it daily to keep sugars down. Counseled on side effects of increased UTIs in men but overall it is a great drug to help improve overall diabetes with excretion of glucose through the kidneys and urine if he doesn't have increased UTIs/yeast infections.   - Hemoglobin A1c; Future  - Hemoglobin A1c        Return in about 3 months (around 7/20/2022) for Follow up.      Lyubov Mendoza, APRN, CNP    Subjective:   Ttio Das is a 56 year old male who presents today in the clinic for diabetic follow up. He stopped taking Glimiperide but never started the Jardiance that I had ordered in its place. His weight has improved since stopping the Glimepiride, the cramping has improved and only gets a little numbness and tingling but not often.  He has only occasional numbness and tingling; when he has it he does take a Gabapentin which does reduce his symptoms. He states that the Magnesium does also seems to help the cramping. Blood sugar levels have been between 115-140 depending on what he is eating. Overall he feels much better and is having less other effects.     He states that he has something near his anus but he is embarrassed to show me at this point he will decide next time if he is ready.  He states that he does not have issues with constipation no bleeding in the stool, no straining or pain. Just feels like a growth to him when he sits down on the toilet.     Patient Active Problem List   Diagnosis     Anxiety state     Diabetic peripheral neuropathy (H)     Hyperlipidemia LDL goal <70     Left retinal detachment     Vision loss, left eye     Vitamin D deficiency     Type 2 diabetes mellitus with diabetic neuropathy, without long-term  current use of insulin (H)     Former smoker, stopped smoking in distant past     Gastroesophageal reflux disease without esophagitis     Vaccine refused by patient       Current Outpatient Medications:      atorvastatin (LIPITOR) 10 MG tablet, Take 1 tablet (10 mg) by mouth daily, Disp: 90 tablet, Rfl: 3     gabapentin (NEURONTIN) 100 MG capsule, Take 1 capsule (100 mg) by mouth 3 times daily, Disp: 30 capsule, Rfl: 0     glimepiride (AMARYL) 2 MG tablet, Take 1 tablet (2 mg) by mouth every morning (before breakfast), Disp: 90 tablet, Rfl: 3     metFORMIN (GLUCOPHAGE) 500 MG tablet, Take 2 tablets (1,000 mg) by mouth 2 times daily (with meals), Disp: 360 tablet, Rfl: 3     acetaminophen (TYLENOL) 325 MG tablet, Take 325-650 mg by mouth every 6 hours as needed for mild pain, Disp: , Rfl:      aspirin 81 MG EC tablet, Take 1 tablet (81 mg) by mouth daily (Patient not taking: Reported on 4/20/2022), Disp: 90 tablet, Rfl: 3     blood glucose (ONETOUCH ULTRA) test strip, 1 strip by In Vitro route 4 times daily, Disp: 420 each, Rfl: 2     Cholecalciferol (VITAMIN D3) 50 MCG (2000 UT) CAPS, Take 1,000 Units by mouth, Disp: , Rfl:      empagliflozin (JARDIANCE) 10 MG TABS tablet, Take 1 tablet (10 mg) by mouth daily, Disp: 60 tablet, Rfl: 0     lancets 28G MISC, , Disp: , Rfl:      Lancets 30G MISC, Use to test blood sugar daily as directed, Disp: , Rfl:      pantoprazole (PROTONIX) 40 MG EC tablet, Take 1 tablet (40 mg) by mouth daily (Patient not taking: Reported on 4/20/2022), Disp: 90 tablet, Rfl: 3  Past Medical History:   Diagnosis Date     Diabetes mellitus, type 2 (H)      Throat pain in adult 4/19/2021     No Known Allergies    ROS   ROS: 10 point ROS neg other than the symptoms noted above in the HPI.      Objective:  /66 (BP Location: Left arm, Patient Position: Sitting, Cuff Size: Adult Regular)   Pulse 67   Wt 70.3 kg (155 lb)   SpO2 98%   BMI 25.79 kg/m    General: Patient appears to be in no acute  distress.  MentalStatus: cooperative and well groomed. Cognitive: Oriented to time, place, and person. Reasoning and memory intact. Normal affect. Speech clear and well enunciated.

## 2022-05-19 DIAGNOSIS — E78.5 HYPERLIPIDEMIA LDL GOAL <70: ICD-10-CM

## 2022-05-23 RX ORDER — ATORVASTATIN CALCIUM 10 MG/1
10 TABLET, FILM COATED ORAL DAILY
Qty: 90 TABLET | Refills: 3 | Status: SHIPPED | OUTPATIENT
Start: 2022-05-23

## 2022-07-13 ENCOUNTER — OFFICE VISIT (OUTPATIENT)
Dept: FAMILY MEDICINE | Facility: CLINIC | Age: 57
End: 2022-07-13
Payer: COMMERCIAL

## 2022-07-13 VITALS
WEIGHT: 152.5 LBS | OXYGEN SATURATION: 97 % | HEIGHT: 65 IN | SYSTOLIC BLOOD PRESSURE: 110 MMHG | HEART RATE: 66 BPM | RESPIRATION RATE: 16 BRPM | DIASTOLIC BLOOD PRESSURE: 66 MMHG | TEMPERATURE: 97.2 F | BODY MASS INDEX: 25.41 KG/M2

## 2022-07-13 DIAGNOSIS — E78.5 HYPERLIPIDEMIA LDL GOAL <70: ICD-10-CM

## 2022-07-13 DIAGNOSIS — E11.40 TYPE 2 DIABETES MELLITUS WITH DIABETIC NEUROPATHY, WITHOUT LONG-TERM CURRENT USE OF INSULIN (H): Primary | ICD-10-CM

## 2022-07-13 LAB
ALBUMIN SERPL-MCNC: 3.8 G/DL (ref 3.4–5)
ALP SERPL-CCNC: 78 U/L (ref 40–150)
ALT SERPL W P-5'-P-CCNC: 35 U/L (ref 0–70)
ANION GAP SERPL CALCULATED.3IONS-SCNC: 7 MMOL/L (ref 3–14)
AST SERPL W P-5'-P-CCNC: 27 U/L (ref 0–45)
BILIRUB SERPL-MCNC: 0.7 MG/DL (ref 0.2–1.3)
BUN SERPL-MCNC: 14 MG/DL (ref 7–30)
CALCIUM SERPL-MCNC: 9.1 MG/DL (ref 8.5–10.1)
CHLORIDE BLD-SCNC: 109 MMOL/L (ref 94–109)
CO2 SERPL-SCNC: 24 MMOL/L (ref 20–32)
CREAT SERPL-MCNC: 0.71 MG/DL (ref 0.66–1.25)
GFR SERPL CREATININE-BSD FRML MDRD: >90 ML/MIN/1.73M2
GLUCOSE BLD-MCNC: 134 MG/DL (ref 70–99)
HBA1C MFR BLD: 7.7 % (ref 0–5.6)
POTASSIUM BLD-SCNC: 4.8 MMOL/L (ref 3.4–5.3)
PROT SERPL-MCNC: 7.5 G/DL (ref 6.8–8.8)
SODIUM SERPL-SCNC: 140 MMOL/L (ref 133–144)

## 2022-07-13 PROCEDURE — 36415 COLL VENOUS BLD VENIPUNCTURE: CPT

## 2022-07-13 PROCEDURE — 99214 OFFICE O/P EST MOD 30 MIN: CPT | Performed by: NURSE PRACTITIONER

## 2022-07-13 PROCEDURE — 83036 HEMOGLOBIN GLYCOSYLATED A1C: CPT | Performed by: NURSE PRACTITIONER

## 2022-07-13 PROCEDURE — 80053 COMPREHEN METABOLIC PANEL: CPT | Performed by: NURSE PRACTITIONER

## 2022-07-13 NOTE — ASSESSMENT & PLAN NOTE
Recent Labs   Lab Test 03/31/22  1047 01/19/22  0652   CHOL 137 131   HDL 59 55   LDL 59 46   TRIG 97 149     Takes atorvastatin 10 mg daily. No side effects. Lipids controlled.

## 2022-07-13 NOTE — ASSESSMENT & PLAN NOTE
Lab Results   Component Value Date    A1C 7.7 07/13/2022    A1C 7.1 04/20/2022    A1C 6.3 01/19/2022    A1C 6.6 10/20/2021    A1C 6.4 07/19/2021    A1C 7.4 04/19/2021    A1C 6.9 01/25/2021    A1C 6.6 10/26/2020    A1C 6.7 07/27/2020    A1C 6.5 01/22/2020       A1c is worsening.  states it is hard to stick to a low CHO diet because his cultural food is rice, beans, tortillas. He also finds that when he eats out in a restaurant that it is hard to make good choices.   Medications: discontinued Glimepiride due to adverse side effects and long term risk profile of Glimepiride. His weight has returned to normal. He feels much better, no stomach or chest pains that he was having before. Tried to start Jardiance but it was cost prohibitively expensive for him. He is already on max dose of Metformin and is taking it as directed 2 pills in the morning and 2 in the evening. I discussed with the pharmacist at [facility] pharmacy to see if there were other cheaper choices for him through preferred 1.  She ran semaglutide (Rybelsus) oral medication and she states it was $45 per month instead of $100 that Jardiance was.  I prescribed semaglutide 3 mg daily instead.  I left a message on his phone to let him know that we can try this medicine to see if it is a little cheaper not quite as expensive for him if he is able to afford it.

## 2022-07-13 NOTE — PROGRESS NOTES
"  Assessment & Plan     Type 2 diabetes mellitus with diabetic neuropathy, without long-term current use of insulin (H)  Lab Results   Component Value Date    A1C 7.7 07/13/2022    A1C 7.1 04/20/2022    A1C 6.3 01/19/2022    A1C 6.6 10/20/2021    A1C 6.4 07/19/2021    A1C 7.4 04/19/2021    A1C 6.9 01/25/2021    A1C 6.6 10/26/2020    A1C 6.7 07/27/2020    A1C 6.5 01/22/2020       A1c is worsening.  states it is hard to stick to a low CHO diet because his cultural food is rice, beans, tortillas. He also finds that when he eats out in a restaurant that it is hard to make good choices.   Medications: discontinued Glimepiride due to adverse side effects and long term risk profile of Glimepiride. His weight has returned to normal. He feels much better, no stomach or chest pains that he was having before. Tried to start Jardiance but it was cost prohibitively expensive for him. He is already on max dose of Metformin and is taking it as directed 2 pills in the morning and 2 in the evening. I discussed with the pharmacist at [facility] pharmacy to see if there were other cheaper choices for him through preferred 1.  She ran semaglutide (Rybelsus) oral medication and she states it was $45 per month instead of $100 that Jardiance was.  I prescribed semaglutide 3 mg daily instead.  I left a message on his phone to let him know that we can try this medicine to see if it is a little cheaper not quite as expensive for him if he is able to afford it.       BMI:   Estimated body mass index is 25.38 kg/m  as calculated from the following:    Height as of this encounter: 1.651 m (5' 5\").    Weight as of this encounter: 69.2 kg (152 lb 8 oz).       Return in about 3 months (around 10/13/2022) for Follow up.    MATT Rivas CNP  [facility]    Andre Francis is a 57 year old presenting for the following health issues:  Diabetes      HPI     Diabetes Follow-up    How often are you " checking your blood sugar? A few times a week  What time of day are you checking your blood sugars (select all that apply)?  Before and after meals  Have you had any blood sugars above 200?  No  Have you had any blood sugars below 70?  No    What symptoms do you notice when your blood sugar is low?  None    What concerns do you have today about your diabetes? None     Do you have any of these symptoms? (Select all that apply)  No numbness or tingling in feet.  No redness, sores or blisters on feet.  No complaints of excessive thirst.  No reports of blurry vision.  No significant changes to weight.    Overall he feels really good, much better now that he is not on glimepiride.  His weight has been gone back down to a level that he feels more comfortable with.  He is no longer having stomach pains, chest pains, weird chest wall pains he was having from glimepiride.  He is not really sticking to a low carbohydrate diet he finds it very difficult and restricting with his cultural foods being primarily beans, rice, tortillas.  He did not start the Jardiance because it was too expensive for him he could not afford it the co-pay was too high.  He is already on max dose of metformin.    BP Readings from Last 2 Encounters:   07/13/22 110/66   04/20/22 110/66     Hemoglobin A1C POCT (%)   Date Value   04/19/2021 7.4 (H)   01/25/2021 6.9 (H)     Hemoglobin A1C (%)   Date Value   04/20/2022 7.1 (H)   01/19/2022 6.3 (H)     LDL Cholesterol Calculated (mg/dL)   Date Value   03/31/2022 59   01/19/2022 46   01/25/2021 55   01/22/2020 40       Hyperlipidemia Follow-Up      Are you regularly taking any medication or supplement to lower your cholesterol?       Are you having muscle aches or other side effects that you think could be caused by your cholesterol lowering medication?  No      How many servings of fruits and vegetables do you eat daily?  0-1    On average, how many sweetened beverages do you drink each day (Examples: soda,  "juice, sweet tea, etc.  Do NOT count diet or artificially sweetened beverages)?   0    How many days per week do you exercise enough to make your heart beat faster? 6    How many minutes a day do you exercise enough to make your heart beat faster? 60 or more    How many days per week do you miss taking your medication? 0    No issues, lipids controlled, no side effects taking the atorvastatin.     Review of Systems   Constitutional, HEENT, cardiovascular, pulmonary, gi and gu systems are negative, except as otherwise noted.      Objective    /66 (BP Location: Left arm, Patient Position: Sitting, Cuff Size: Adult Regular)   Pulse 66   Temp 97.2  F (36.2  C) (Tympanic)   Resp 16   Ht 1.651 m (5' 5\")   Wt 69.2 kg (152 lb 8 oz)   SpO2 97%   BMI 25.38 kg/m    Body mass index is 25.38 kg/m .  Physical Exam   GENERAL: healthy, alert and no distress  EYES: Eyes grossly normal to inspection, PERRL and conjunctivae and sclerae normal  HENT: ear canals and TM's normal, nose and mouth without ulcers or lesions  NECK: no adenopathy, no asymmetry, masses, or scars and thyroid normal to palpation  RESP: lungs clear to auscultation - no rales, rhonchi or wheezes  CV: regular rate and rhythm, normal S1 S2, no S3 or S4, no murmur, click or rub, no peripheral edema and peripheral pulses strong  PSYCH: mentation appears normal, affect normal/bright                .  ..    "

## 2022-07-13 NOTE — LETTER
"July 13, 2022      Tito Das  [address] [address]        Dear ,    We are writing to inform you of your test results.    Tito,   La diabetes es un poco peor; a1c nivel es 7.7. Yo hable con la farmacia y hay otra medicina se llama Semglutide 3 mg que se tome diario. Esta medicina es senior por la diabetes y es un poco mas barato; que marissa 45$ de receta. Espero que esta ayudarle mejor. Por favor llame la farmacia para conseguir la medicina. En el principio algunas personas tengan afectos satish nausea, diarea y dolor de estmago kaleb en tiempo estos afectos se quitan     Lyubov\"       Resulted Orders   Hemoglobin A1c   Result Value Ref Range    Hemoglobin A1C 7.7 (H) 0.0 - 5.6 %      Comment:      Normal <5.7%   Prediabetes 5.7-6.4%    Diabetes 6.5% or higher     Note: Adopted from ADA consensus guidelines.       If you have any questions or concerns, please call the clinic at the number listed above.       Sincerely,      Lyubov Meyers, MATT CNP                            "

## 2022-10-11 ENCOUNTER — TELEPHONE (OUTPATIENT)
Dept: FAMILY MEDICINE | Facility: CLINIC | Age: 57
End: 2022-10-11

## 2022-10-11 NOTE — TELEPHONE ENCOUNTER
LVM for patient to call and reschedule his office visit and Thurs to a different time as we are closed for the flu clinic.     Michell Munguia, CMA

## 2022-10-18 ENCOUNTER — OFFICE VISIT (OUTPATIENT)
Dept: FAMILY MEDICINE | Facility: CLINIC | Age: 57
End: 2022-10-18
Payer: COMMERCIAL

## 2022-10-18 VITALS — SYSTOLIC BLOOD PRESSURE: 122 MMHG | DIASTOLIC BLOOD PRESSURE: 68 MMHG | WEIGHT: 150 LBS | BODY MASS INDEX: 24.96 KG/M2

## 2022-10-18 DIAGNOSIS — R09.82 POST-NASAL DRIP: ICD-10-CM

## 2022-10-18 DIAGNOSIS — J01.90 ACUTE NON-RECURRENT SINUSITIS, UNSPECIFIED LOCATION: ICD-10-CM

## 2022-10-18 DIAGNOSIS — G47.00 INSOMNIA, UNSPECIFIED TYPE: ICD-10-CM

## 2022-10-18 DIAGNOSIS — H93.13 TINNITUS, BILATERAL: ICD-10-CM

## 2022-10-18 DIAGNOSIS — E11.40 TYPE 2 DIABETES MELLITUS WITH DIABETIC NEUROPATHY, WITHOUT LONG-TERM CURRENT USE OF INSULIN (H): Primary | ICD-10-CM

## 2022-10-18 LAB
ANION GAP SERPL CALCULATED.3IONS-SCNC: 14 MMOL/L (ref 7–15)
BUN SERPL-MCNC: 19 MG/DL (ref 8–23)
CALCIUM SERPL-MCNC: 9.9 MG/DL (ref 8.6–10)
CHLORIDE SERPL-SCNC: 102 MMOL/L (ref 98–107)
CREAT SERPL-MCNC: 0.85 MG/DL (ref 0.67–1.17)
CREAT UR-MCNC: 268.9 MG/DL
DEPRECATED HCO3 PLAS-SCNC: 26 MMOL/L (ref 22–29)
GFR SERPL CREATININE-BSD FRML MDRD: >90 ML/MIN/1.73M2
GLUCOSE SERPL-MCNC: 179 MG/DL (ref 70–99)
HBA1C MFR BLD: 7.5 % (ref 0–5.6)
MICROALBUMIN UR-MCNC: 16 MG/L
MICROALBUMIN/CREAT UR: 5.95 MG/G CR (ref 0–17)
POTASSIUM SERPL-SCNC: 4.7 MMOL/L (ref 3.4–5.3)
SODIUM SERPL-SCNC: 142 MMOL/L (ref 136–145)
TSH SERPL DL<=0.005 MIU/L-ACNC: 2.45 UIU/ML (ref 0.3–4.2)

## 2022-10-18 PROCEDURE — 83036 HEMOGLOBIN GLYCOSYLATED A1C: CPT

## 2022-10-18 PROCEDURE — 84443 ASSAY THYROID STIM HORMONE: CPT | Performed by: NURSE PRACTITIONER

## 2022-10-18 PROCEDURE — 80048 BASIC METABOLIC PNL TOTAL CA: CPT | Performed by: NURSE PRACTITIONER

## 2022-10-18 PROCEDURE — 82043 UR ALBUMIN QUANTITATIVE: CPT | Performed by: NURSE PRACTITIONER

## 2022-10-18 PROCEDURE — 36415 COLL VENOUS BLD VENIPUNCTURE: CPT

## 2022-10-18 PROCEDURE — 99215 OFFICE O/P EST HI 40 MIN: CPT | Performed by: NURSE PRACTITIONER

## 2022-10-18 RX ORDER — HYDROXYZINE HYDROCHLORIDE 25 MG/1
25-50 TABLET, FILM COATED ORAL
Qty: 30 TABLET | Refills: 0 | Status: SHIPPED | OUTPATIENT
Start: 2022-10-18

## 2022-10-18 RX ORDER — HYDROXYZINE HYDROCHLORIDE 25 MG/1
25-50 TABLET, FILM COATED ORAL
Qty: 30 TABLET | Refills: 0 | Status: SHIPPED | OUTPATIENT
Start: 2022-10-18 | End: 2022-10-18

## 2022-10-18 RX ORDER — FLUTICASONE PROPIONATE 50 MCG
1 SPRAY, SUSPENSION (ML) NASAL DAILY
Qty: 16 G | Refills: 11 | Status: SHIPPED | OUTPATIENT
Start: 2022-10-18

## 2022-10-18 ASSESSMENT — ENCOUNTER SYMPTOMS
RHINORRHEA: 1
SORE THROAT: 1
FATIGUE: 1
COUGH: 1
SINUS PRESSURE: 1
VOICE CHANGE: 1
HEADACHES: 1

## 2022-10-18 NOTE — LETTER
October 25, 2022      Tito Das  [address] [place] [address]        Dear ,    We are writing to inform you of your test results.    Your other labs came back normal. A1C and glucose were the only ones elevated which we discussed in clinic. Follow-up in 3 months for recheck.     Thanks,       Resulted Orders   Hemoglobin A1c   Result Value Ref Range    Hemoglobin A1C 7.5 (H) 0.0 - 5.6 %      Comment:      Normal <5.7%   Prediabetes 5.7-6.4%    Diabetes 6.5% or higher     Note: Adopted from ADA consensus guidelines.   Albumin Random Urine Quantitative with Creat Ratio   Result Value Ref Range    Albumin Urine mg/L 16.0 mg/L      Comment:      The reference ranges have not been established in urine albumin. The results should be integrated into the clinical context for interpretation.    Albumin Urine mg/g Cr 5.95 0.00 - 17.00 mg/g Cr      Comment:      Microalbuminuria is defined as an albumin:creatinine ratio of 17 to 299 for males and 25 to 299 for females. A ratio of albumin:creatinine of 300 or higher is indicative of overt proteinuria.  Due to biologic variability, positive results should be confirmed by a second, first-morning random or 24-hour timed urine specimen. If there is discrepancy, a third specimen is recommended. When 2 out of 3 results are in the microalbuminuria range, this is evidence for incipient nephropathy and warrants increased efforts at glucose control, blood pressure control, and institution of therapy with an angiotensin-converting-enzyme (ACE) inhibitor (if the patient can tolerate it).      Creatinine Urine mg/dL 268.9 mg/dL      Comment:      The reference ranges have not been established in urine creatinine. The results should be integrated into the clinical context for interpretation.   Basic metabolic panel   Result Value Ref Range    Sodium 142 136 - 145 mmol/L    Potassium 4.7 3.4 - 5.3 mmol/L    Chloride 102 98 - 107 mmol/L    Carbon Dioxide (CO2) 26 22 - 29  mmol/L    Anion Gap 14 7 - 15 mmol/L    Urea Nitrogen 19.0 8.0 - 23.0 mg/dL    Creatinine 0.85 0.67 - 1.17 mg/dL    Calcium 9.9 8.6 - 10.0 mg/dL    Glucose 179 (H) 70 - 99 mg/dL    GFR Estimate >90 >60 mL/min/1.73m2      Comment:      Effective December 21, 2021 eGFRcr in adults is calculated using the 2021 CKD-EPI creatinine equation which includes age and gender (Nataly et al., NEJM, DOI: 10.1056/SKTUsi4208948)   TSH with free T4 reflex   Result Value Ref Range    TSH 2.45 0.30 - 4.20 uIU/mL       If you have any questions or concerns, please call the clinic at the number listed above.       Sincerely,      MATT Garcia CNP

## 2022-10-18 NOTE — PATIENT INSTRUCTIONS
You have a sinus infection.  Take antibiotics as prescribed for the full course.  Take a probiotic and/or eat yogurt daily while on antibiotics and ~1 week after to prevent GI upset.  Continue to use OTC medications for symptom relief.   Rest and stay hydrated.  Use a humidifier in the bedroom, warm compresses on the face, and steam inhalation.  If symptoms worsen or do not improve within 1 week, please follow-up with your PCP.    Medications refilled.     You can take hydroxyzine as needed at night time to help with sleep. I would also recommend a fan or sound machine to help block out the ringing in your ears.     Audiology referral was placed to have your ears looked at to find the cause of the ringing.     Use the Flonase daily to help reduce the fluid behind your ears. This may help with the ringing in your ears, but it will also help with the nasal drip/runny nose.     Labs drawn today. We will contact you with those results. Follow-up in 3 months if not instructed to do so sooner after labs come back.

## 2022-10-18 NOTE — PROGRESS NOTES
Assessment & Plan     Type 2 diabetes mellitus with diabetic neuropathy, without long-term current use of insulin (H)  A1C staying consistent. He has been off his semiglutide for a while. A1C will likely improve after he has been back on that. Metformin refilled. Labs done. Follow-up in 3 months for repeat A1C.     - metFORMIN (GLUCOPHAGE) 500 MG tablet; Take 2 tablets (1,000 mg) by mouth 2 times daily (with meals)  - Hemoglobin A1c  - Albumin Random Urine Quantitative with Creat Ratio  - Basic metabolic panel    Tinnitus, bilateral  Labs done. Audiology referral placed.     - Basic metabolic panel  - Adult Audiology  Referral; Future  - TSH with free T4 reflex    Insomnia, unspecified type  Possibly related to tinnitus or sinus symptoms. Hydroxyzine PRN prescribed and dispensed in clinic. Side effects, risks and benefits of medication were discussed with patient. Discussed how and when to take medication.     - hydrOXYzine (ATARAX) 25 MG tablet; Take 1-2 tablets (25-50 mg) by mouth nightly as needed for other (insomnia)    Post-nasal drip  Flonase recommended. Out of stock in clinic.     - fluticasone (FLONASE) 50 MCG/ACT nasal spray; Spray 1 spray into both nostrils daily    Acute non-recurrent sinusitis, unspecified location  Augmentin prescribed and dispensed in clinic. Side effects, risks and benefits of medication were discussed with patient. Discussed how and when to take medication. Recommended taking a probiotic and/or eating yogurt every day while on antibiotics and for ~1 week after stopping antibiotics to prevent GI upset. Discussed OTC recommendations for symptom control. Rest, hydration, humidification.     - amoxicillin-clavulanate (AUGMENTIN) 875-125 MG tablet; Take 1 tablet by mouth 2 times daily for 7 days      I spent a total of 46 minutes on the day of the visit.   Time spent doing chart review, history and exam, documentation and further activities per the note         BMI:  "  Estimated body mass index is 24.96 kg/m  as calculated from the following:    Height as of 7/13/22: 1.651 m (5' 5\").    Weight as of this encounter: 68 kg (150 lb).       Patient Instructions   You have a sinus infection.  1. Take antibiotics as prescribed for the full course.  2. Take a probiotic and/or eat yogurt daily while on antibiotics and ~1 week after to prevent GI upset.  3. Continue to use OTC medications for symptom relief.   4. Rest and stay hydrated.  5. Use a humidifier in the bedroom, warm compresses on the face, and steam inhalation.  6. If symptoms worsen or do not improve within 1 week, please follow-up with your PCP.    Medications refilled.     You can take hydroxyzine as needed at night time to help with sleep. I would also recommend a fan or sound machine to help block out the ringing in your ears.     Audiology referral was placed to have your ears looked at to find the cause of the ringing.     Use the Flonase daily to help reduce the fluid behind your ears. This may help with the ringing in your ears, but it will also help with the nasal drip/runny nose.     Labs drawn today. We will contact you with those results. Follow-up in 3 months if not instructed to do so sooner after labs come back.       Return if symptoms worsen or fail to improve.    Narda Fernandez, JOYCE, NP-C   [facility] Provider  [facility]    Andre Francis is a 57 year old, presenting for the following health issues:  Diabetes (Patient is here today for his diabetic follow up. Patient states that he is feeling fine. )      HPI     Diabetes Follow-up    How often are you checking your blood sugar? A few times a week  What time of day are you checking your blood sugars (select all that apply)?  In the morning  Have you had any blood sugars above 200?  No  Have you had any blood sugars below 70?  No    What symptoms do you notice when your blood sugar is low?  Shaky  - But just for a few " seconds when he goes to bed.     What concerns do you have today about your diabetes? None and Other: Concerns about not sleeping enough, patient states that he is only getting about 2 hours of sleep.      Do you have any of these symptoms? (Select all that apply)  No numbness or tingling in feet.  No redness, sores or blisters on feet.  No complaints of excessive thirst.  No reports of blurry vision.  No significant changes to weight.      BP Readings from Last 2 Encounters:   10/18/22 122/68   07/13/22 110/66     Hemoglobin A1C (%)   Date Value   07/13/2022 7.7 (H)   04/20/2022 7.1 (H)   04/19/2021 7.4 (H)   01/25/2021 6.9 (H)     LDL Cholesterol Calculated (mg/dL)   Date Value   03/31/2022 59   01/19/2022 46   01/25/2021 55   01/22/2020 40           How many servings of fruits and vegetables do you eat daily?  0-1    On average, how many sweetened beverages do you drink each day (Examples: soda, juice, sweet tea, etc.  Do NOT count diet or artificially sweetened beverages)?   0    How many days per week do you exercise enough to make your heart beat faster? 3 or less    How many minutes a day do you exercise enough to make your heart beat faster? 9 or less    How many days per week do you miss taking your medication? 0    Additional provider notes: Patient presents in clinic for follow-up on DM. States his sugars have been under control. He had issues with abdominal pain initially after taking Semaglutide, but now states it is better especially if he takes it with a lot of water. He is restarting it.     Concerns: not sleeping enough. Only sleeping 2 hours a night. This started a month ago. States he hears a noise in his ears for about a month and wonders if that is what is waking him up/keep him from sleeping. After further discussion, he also mentions pressure on his forehead and relief with nyquil. He thinks he has an infection. Symptoms going on for several weeks.     Allergies/PND: needing refill on flonase.  He uses it once a day as needed.    No Known Allergies    Current Outpatient Medications   Medication     atorvastatin (LIPITOR) 10 MG tablet     Cholecalciferol (VITAMIN D3) 50 MCG (2000 UT) CAPS     metFORMIN (GLUCOPHAGE) 500 MG tablet     acetaminophen (TYLENOL) 325 MG tablet     aspirin 81 MG EC tablet     blood glucose (ONETOUCH ULTRA) test strip     gabapentin (NEURONTIN) 100 MG capsule     lancets 28G MISC     Lancets 30G MISC     Semaglutide 3 MG TABS     No current facility-administered medications for this visit.       Past Medical History:   Diagnosis Date     Diabetes mellitus, type 2 (H)      Throat pain in adult 4/19/2021          Review of Systems   Constitutional: Positive for fatigue.   HENT: Positive for congestion, ear pain (pressure), postnasal drip, rhinorrhea, sinus pressure, sore throat and voice change.    Respiratory: Positive for cough.    Neurological: Positive for headaches.            Objective    /68 (BP Location: Left arm, Patient Position: Sitting, Cuff Size: Adult Regular)   Wt 68 kg (150 lb)   BMI 24.96 kg/m    Body mass index is 24.96 kg/m .  Physical Exam  Vitals reviewed.   Constitutional:       General: He is not in acute distress.     Appearance: Normal appearance. He is not ill-appearing or toxic-appearing.   HENT:      Head: Normocephalic and atraumatic.      Right Ear: Ear canal and external ear normal. Tympanic membrane is bulging. Tympanic membrane is not erythematous.      Left Ear: Ear canal and external ear normal. Tympanic membrane is bulging. Tympanic membrane is not erythematous.      Nose: Congestion and rhinorrhea present.      Mouth/Throat:      Mouth: Mucous membranes are moist.      Pharynx: Oropharynx is clear. Posterior oropharyngeal erythema present.   Cardiovascular:      Rate and Rhythm: Normal rate and regular rhythm.      Pulses: Normal pulses.      Heart sounds: Normal heart sounds.   Pulmonary:      Effort: Pulmonary effort is normal.       Breath sounds: Normal breath sounds.   Musculoskeletal:      Cervical back: Normal range of motion and neck supple. No tenderness.   Lymphadenopathy:      Cervical: No cervical adenopathy.   Skin:     General: Skin is warm and dry.   Neurological:      Mental Status: He is alert and oriented to person, place, and time.   Psychiatric:         Behavior: Behavior normal.            Results for orders placed or performed in visit on 10/18/22 (from the past 24 hour(s))   Hemoglobin A1c   Result Value Ref Range    Hemoglobin A1C 7.5 (H) 0.0 - 5.6 %

## 2022-11-14 NOTE — TELEPHONE ENCOUNTER
FUTURE VISIT INFORMATION      FUTURE VISIT INFORMATION:    Date: 11/15/22    Time: 1:50pm    Location: [facility]  REFERRAL INFORMATION:    Referring provider:  Narda Fernandez APRN CNP    Referring providers clinic:  [facility]     Reason for visit/diagnosis  new per pt/ref for Tinnitus, bilateral [H93.13] Tinnitus Evaluation per referral notes, recs in [facility]/care everywhere, confirmed [facility] location, needs , bringing insurance card for verification - not able to verify during call    RECORDS REQUESTED FROM:       Clinic name Comments Records Status Imaging Status   [facility]   10/18/22 - note from Narda Fernandez APRN CNP Epic

## 2022-11-15 ENCOUNTER — PRE VISIT (OUTPATIENT)
Dept: OTOLARYNGOLOGY | Facility: CLINIC | Age: 57
End: 2022-11-15

## 2022-11-15 ENCOUNTER — OFFICE VISIT (OUTPATIENT)
Dept: AUDIOLOGY | Facility: CLINIC | Age: 57
End: 2022-11-15
Attending: NURSE PRACTITIONER
Payer: COMMERCIAL

## 2022-11-15 ENCOUNTER — OFFICE VISIT (OUTPATIENT)
Dept: OTOLARYNGOLOGY | Facility: CLINIC | Age: 57
End: 2022-11-15
Attending: NURSE PRACTITIONER
Payer: COMMERCIAL

## 2022-11-15 DIAGNOSIS — H93.11 TINNITUS OF RIGHT EAR: Primary | ICD-10-CM

## 2022-11-15 DIAGNOSIS — H90.41 SENSORINEURAL HEARING LOSS (SNHL) OF RIGHT EAR WITH UNRESTRICTED HEARING OF LEFT EAR: ICD-10-CM

## 2022-11-15 DIAGNOSIS — H93.13 TINNITUS, BILATERAL: ICD-10-CM

## 2022-11-15 DIAGNOSIS — H93.13 TINNITUS, BILATERAL: Primary | ICD-10-CM

## 2022-11-15 PROCEDURE — 92557 COMPREHENSIVE HEARING TEST: CPT | Performed by: AUDIOLOGIST

## 2022-11-15 PROCEDURE — 99207 PR NO CHARGE LOS: CPT | Performed by: REGISTERED NURSE

## 2022-11-15 PROCEDURE — 92550 TYMPANOMETRY & REFLEX THRESH: CPT | Performed by: AUDIOLOGIST

## 2022-11-15 NOTE — PROGRESS NOTES
AUDIOLOGY REPORT     SUMMARY: Audiology visit completed. See audiogram for results.       RECOMMENDATIONS: Follow-up with ENT.     Osorio Nix CCC-A  Licensed Audiologist   [place] #[id number]

## 2022-11-15 NOTE — LETTER
11/15/2022       RE: Tito Das  [address] [facility] [address]     Dear Colleague,    Thank you for referring your patient, Tito Das, to the [facility] [place] [facility]. Please see a copy of my visit note below.    Patient could not stay for scheduled appointment.     Will call patient with audiogram results and review of tinnitus.    Bhavna Suarez DNP, APRN, CNP.  11/15/2022       Again, thank you for allowing me to participate in the care of your patient.      Sincerely,    Munira Suarez, NP

## 2022-11-18 ENCOUNTER — TELEPHONE (OUTPATIENT)
Dept: AUDIOLOGY | Facility: CLINIC | Age: 57
End: 2022-11-18

## 2022-11-18 NOTE — TELEPHONE ENCOUNTER
Returned patient's call with  as requested. He clarified that he did not meet with the ENT provider after the audiogram as he had to leave. He stated that the ENT was going to call and talk with him, but he had not heard back. Therefore, he was calling to speak to the ENT regarding recommendations.     Request will be sent to ENT provider per patient request.     Stefany Nix. CCC-A  Licensed Audiologist   [place] #[id number]

## 2022-11-18 NOTE — TELEPHONE ENCOUNTER
[facility] Call Center    Phone Message    May a detailed message be left on voicemail: no     Reason for Call: Other: pt calling wanting a call from clinic with test results from audiogram please call at [phone] to discuss need  on call    Action Taken: Other: routing to [facility] audiology adult [facility]    Travel Screening: Not Applicable

## 2022-11-21 ENCOUNTER — TELEPHONE (OUTPATIENT)
Dept: OTOLARYNGOLOGY | Facility: CLINIC | Age: 57
End: 2022-11-21

## 2022-11-21 NOTE — TELEPHONE ENCOUNTER
Left message with patient to discuss audiogram results and tinnitus management. Call back number left on message.  used to communicate message.    Bhavna Suarez DNP, APRN, CNP.  11/21/2022 9:43 AM

## 2022-11-21 NOTE — PROGRESS NOTES
Patient could not stay for scheduled appointment.     Will call patient with audiogram results and review of tinnitus.    Bhavna Suarez DNP, APRN, CNP.  11/15/2022

## 2022-12-07 ENCOUNTER — VIRTUAL VISIT (OUTPATIENT)
Dept: OTOLARYNGOLOGY | Facility: CLINIC | Age: 57
End: 2022-12-07
Payer: COMMERCIAL

## 2022-12-07 VITALS — HEIGHT: 65 IN | WEIGHT: 150 LBS | BODY MASS INDEX: 24.99 KG/M2

## 2022-12-07 DIAGNOSIS — H93.11 TINNITUS, RIGHT: Primary | ICD-10-CM

## 2022-12-07 PROCEDURE — 99215 OFFICE O/P EST HI 40 MIN: CPT | Mod: 95 | Performed by: REGISTERED NURSE

## 2022-12-07 NOTE — LETTER
12/7/2022       RE: Tito Das  [address] [facility] [address]     Dear Colleague,    Thank you for referring your patient, Tito Das, to the [facility] [place] at [facility]. Please see a copy of my visit note below.    Tito is a 57 year old who is being evaluated via a billable telephone visit.      What phone number would you like to be contacted at? [phone]  How would you like to obtain your AVS? Mail a copy    Chief Complaint:   Bilateral tinnitus       History of Present Illness:   Patient's history is obtained with assistance of a . Tito Das is a 57 year old male who reports a 3 month history of gradual tinnitus. Patient states that symptoms are primarily in the right ear. Describes noise as an electrical buzzing, no pulsating. Feels that tinnitus is 'invading his head. Most bothersome at night when patient is trying to sleep. Patient taking sleeping pills that is only moderately helping patient sleep due to tinnitus.     Patient denies any otalgia, otorrhea, dizziness, hearing loss, facial numbness/weakness, history of frequent ear infections, or ear surgeries. Denies family history of hearing loss, significant noise exposure, or history of concussions.      Past Medical History:  Past Medical History:   Diagnosis Date     Diabetes mellitus, type 2 (H)      Throat pain in adult 4/19/2021     Past Surgical History:  No past surgical history on file.    Medications:  Current Outpatient Medications   Medication Sig Dispense Refill     acetaminophen (TYLENOL) 325 MG tablet Take 325-650 mg by mouth every 6 hours as needed for mild pain       aspirin 81 MG EC tablet Take 1 tablet (81 mg) by mouth daily (Patient not taking: No sig reported) 90 tablet 3     atorvastatin (LIPITOR) 10 MG tablet Take 1 tablet (10 mg) by mouth daily 90 tablet 3     blood glucose (ONETOUCH ULTRA) test strip 1 strip by  In Vitro route 4 times daily 420 each 2     Cholecalciferol (VITAMIN D3) 50 MCG (2000 UT) CAPS Take 1,000 Units by mouth       fluticasone (FLONASE) 50 MCG/ACT nasal spray Spray 1 spray into both nostrils daily 16 g 11     gabapentin (NEURONTIN) 100 MG capsule Take 1 capsule (100 mg) by mouth 3 times daily (Patient not taking: Reported on 10/18/2022) 30 capsule 0     hydrOXYzine (ATARAX) 25 MG tablet Take 1-2 tablets (25-50 mg) by mouth nightly as needed for other (insomnia) 30 tablet 0     lancets 28G MISC        Lancets 30G MISC Use to test blood sugar daily as directed       metFORMIN (GLUCOPHAGE) 500 MG tablet Take 2 tablets (1,000 mg) by mouth 2 times daily (with meals) 360 tablet 3     Semaglutide 3 MG TABS Take 3 mg by mouth daily 90 tablet 3       Allergies:  No Known Allergies     Social History:  Social History     Tobacco Use     Smoking status: Former     Types: Cigarettes     Quit date: 2020     Years since quittin.0     Smokeless tobacco: Never   Substance Use Topics     Alcohol use: Not Currently     Drug use: Never       ROS: 10 point ROS neg other than the symptoms noted above in the HPI.    Physical Exam:    Limited due to phone visit    Constitutional:  The patient was in no acute distress.     Psychiatric: The patient's affect was calm, cooperative, and appropriate.    Communication:  Normal; communicates verbally, normal voice quality.       Audiogram: 11/15/2022 - data independently reviewed  Right ear: Normal sloping to moderate rising to borderline normal   Left ear: Normal sloping to borderline normal hearing    WR right: 92% left: 96% at 60 dB  Acoustic Reflexes: Present in all conditions  Tympanograms: type A bilaterally         Assessment and Plan:  1. Tinnitus, right  Patient with bothersome right-sided tinnitus. Explained to patient that tinnitus is a central process but is closely correlated to hearing loss. Patient's tinnitus symptoms may be related to hearing loss but  intensity of tinnitus is not necessarily consistent with amount of hearing loss that patient has. Reviewed other factors that can contribute to tinnitus including stress, anxiety, lack of sleep, and neck/muscle tension. Patient reports that tinnitus is greatly affecting sleep which can significantly contribute to tinnitus symptoms.     Explained to patient that there is no specific medication or procedure that can eliminate tinnitus, however, there are evidence-based management strategies that can be used to improve symptoms.  Discussed management strategies with patient including tinnitus masking and cognitive behavioral approaches.  Many people with tinnitus find improvement of symptoms with these management strategies but this can take some time and work from patient to adopt these strategies for management.  Recommend that patient start to use tinnitus masking strategies at night for management of bothersome night symptoms.     Because patient is having unilateral tinnitus symptoms, recommend MRI brain to rule out a retrocochlear lesion that could be contributing to symptoms. Will call patient with MRI results once available.    Recommend patient to follow up in 1 year with repeat audiogram to monitor hearing loss.    Bhavna Suarez DNP, APRN, CNP  Otolaryngology  Head & Neck Surgery  [phone]    45 minutes spent on the date of the encounter doing chart review, history and exam, documentation and further activities per the note    Phone call duration: 28 minutes          Again, thank you for allowing me to participate in the care of your patient.      Sincerely,    Munira Suarez NP

## 2022-12-07 NOTE — LETTER
Date:December 9, 2022      Provider requested that no letter be sent. Do not send.       [facility]

## 2022-12-07 NOTE — PROGRESS NOTES
Tito is a 57 year old who is being evaluated via a billable telephone visit.      What phone number would you like to be contacted at? [phone]  How would you like to obtain your AVS? Mail a copy    Chief Complaint:   Bilateral tinnitus       History of Present Illness:   Patient's history is obtained with assistance of a . Tito Das is a 57 year old male who reports a 3 month history of gradual tinnitus. Patient states that symptoms are primarily in the right ear. Describes noise as an electrical buzzing, no pulsating. Feels that tinnitus is 'invading his head. Most bothersome at night when patient is trying to sleep. Patient taking sleeping pills that is only moderately helping patient sleep due to tinnitus.     Patient denies any otalgia, otorrhea, dizziness, hearing loss, facial numbness/weakness, history of frequent ear infections, or ear surgeries. Denies family history of hearing loss, significant noise exposure, or history of concussions.      Past Medical History:  Past Medical History:   Diagnosis Date     Diabetes mellitus, type 2 (H)      Throat pain in adult 4/19/2021     Past Surgical History:  No past surgical history on file.    Medications:  Current Outpatient Medications   Medication Sig Dispense Refill     acetaminophen (TYLENOL) 325 MG tablet Take 325-650 mg by mouth every 6 hours as needed for mild pain       aspirin 81 MG EC tablet Take 1 tablet (81 mg) by mouth daily (Patient not taking: No sig reported) 90 tablet 3     atorvastatin (LIPITOR) 10 MG tablet Take 1 tablet (10 mg) by mouth daily 90 tablet 3     blood glucose (ONETOUCH ULTRA) test strip 1 strip by In Vitro route 4 times daily 420 each 2     Cholecalciferol (VITAMIN D3) 50 MCG (2000 UT) CAPS Take 1,000 Units by mouth       fluticasone (FLONASE) 50 MCG/ACT nasal spray Spray 1 spray into both nostrils daily 16 g 11     gabapentin (NEURONTIN) 100 MG capsule Take 1 capsule (100 mg) by mouth 3 times daily  (Patient not taking: Reported on 10/18/2022) 30 capsule 0     hydrOXYzine (ATARAX) 25 MG tablet Take 1-2 tablets (25-50 mg) by mouth nightly as needed for other (insomnia) 30 tablet 0     lancets 28G MISC        Lancets 30G MISC Use to test blood sugar daily as directed       metFORMIN (GLUCOPHAGE) 500 MG tablet Take 2 tablets (1,000 mg) by mouth 2 times daily (with meals) 360 tablet 3     Semaglutide 3 MG TABS Take 3 mg by mouth daily 90 tablet 3       Allergies:  No Known Allergies     Social History:  Social History     Tobacco Use     Smoking status: Former     Types: Cigarettes     Quit date: 2020     Years since quittin.0     Smokeless tobacco: Never   Substance Use Topics     Alcohol use: Not Currently     Drug use: Never       ROS: 10 point ROS neg other than the symptoms noted above in the HPI.    Physical Exam:    Limited due to phone visit    Constitutional:  The patient was in no acute distress.     Psychiatric: The patient's affect was calm, cooperative, and appropriate.    Communication:  Normal; communicates verbally, normal voice quality.       Audiogram: 11/15/2022 - data independently reviewed  Right ear: Normal sloping to moderate rising to borderline normal   Left ear: Normal sloping to borderline normal hearing    WR right: 92% left: 96% at 60 dB  Acoustic Reflexes: Present in all conditions  Tympanograms: type A bilaterally         Assessment and Plan:  1. Tinnitus, right  Patient with bothersome right-sided tinnitus. Explained to patient that tinnitus is a central process but is closely correlated to hearing loss. Patient's tinnitus symptoms may be related to hearing loss but intensity of tinnitus is not necessarily consistent with amount of hearing loss that patient has. Reviewed other factors that can contribute to tinnitus including stress, anxiety, lack of sleep, and neck/muscle tension. Patient reports that tinnitus is greatly affecting sleep which can significantly contribute to  tinnitus symptoms.     Explained to patient that there is no specific medication or procedure that can eliminate tinnitus, however, there are evidence-based management strategies that can be used to improve symptoms.  Discussed management strategies with patient including tinnitus masking and cognitive behavioral approaches.  Many people with tinnitus find improvement of symptoms with these management strategies but this can take some time and work from patient to adopt these strategies for management.  Recommend that patient start to use tinnitus masking strategies at night for management of bothersome night symptoms.     Because patient is having unilateral tinnitus symptoms, recommend MRI brain to rule out a retrocochlear lesion that could be contributing to symptoms. Will call patient with MRI results once available.    Recommend patient to follow up in 1 year with repeat audiogram to monitor hearing loss.    Bhavna Suarez DNP, APRN, CNP  Otolaryngology  Head & Neck Surgery  [phone]    45 minutes spent on the date of the encounter doing chart review, history and exam, documentation and further activities per the note    Phone call duration: 28 minutes

## 2022-12-20 DIAGNOSIS — E11.40 TYPE 2 DIABETES MELLITUS WITH DIABETIC NEUROPATHY, WITHOUT LONG-TERM CURRENT USE OF INSULIN (H): ICD-10-CM

## 2022-12-20 NOTE — TELEPHONE ENCOUNTER
Reason for Call:  Other appointment    Detailed comments: patient needs his diabetic test strips called in    Phone Number Patient can be reached at: Home number on file [phone] (home)    Best Time: anytime    Can we leave a detailed message on this number? YES    Call taken on 12/20/2022 at 4:13 PM by Norma Ornelas